# Patient Record
Sex: FEMALE | Race: OTHER | Employment: FULL TIME | URBAN - METROPOLITAN AREA
[De-identification: names, ages, dates, MRNs, and addresses within clinical notes are randomized per-mention and may not be internally consistent; named-entity substitution may affect disease eponyms.]

---

## 2017-01-25 RX ORDER — DICLOFENAC POTASSIUM 50 MG/1
TABLET, FILM COATED ORAL
Qty: 60 TABLET | Refills: 0 | Status: SHIPPED | OUTPATIENT
Start: 2017-01-25 | End: 2017-03-25

## 2017-03-08 ENCOUNTER — OFFICE VISIT (OUTPATIENT)
Dept: FAMILY MEDICINE CLINIC | Facility: CLINIC | Age: 57
End: 2017-03-08

## 2017-03-08 ENCOUNTER — APPOINTMENT (OUTPATIENT)
Dept: LAB | Age: 57
End: 2017-03-08
Attending: FAMILY MEDICINE
Payer: COMMERCIAL

## 2017-03-08 VITALS
BODY MASS INDEX: 35 KG/M2 | TEMPERATURE: 98 F | SYSTOLIC BLOOD PRESSURE: 156 MMHG | DIASTOLIC BLOOD PRESSURE: 81 MMHG | WEIGHT: 163 LBS | HEART RATE: 71 BPM

## 2017-03-08 DIAGNOSIS — E78.00 PURE HYPERCHOLESTEROLEMIA: Primary | ICD-10-CM

## 2017-03-08 DIAGNOSIS — E78.00 PURE HYPERCHOLESTEROLEMIA: ICD-10-CM

## 2017-03-08 DIAGNOSIS — R73.03 PREDIABETES: ICD-10-CM

## 2017-03-08 DIAGNOSIS — G56.03 BILATERAL CARPAL TUNNEL SYNDROME: ICD-10-CM

## 2017-03-08 DIAGNOSIS — M15.9 PRIMARY OSTEOARTHRITIS INVOLVING MULTIPLE JOINTS: ICD-10-CM

## 2017-03-08 DIAGNOSIS — M25.511 ACUTE PAIN OF RIGHT SHOULDER: ICD-10-CM

## 2017-03-08 LAB
CHOLEST SERPL-MCNC: 146 MG/DL (ref 110–200)
HDLC SERPL-MCNC: 37 MG/DL
LDLC SERPL CALC-MCNC: 83 MG/DL (ref 0–99)
NONHDLC SERPL-MCNC: 109 MG/DL
TRIGL SERPL-MCNC: 131 MG/DL (ref 1–149)

## 2017-03-08 PROCEDURE — 80061 LIPID PANEL: CPT

## 2017-03-08 PROCEDURE — 99212 OFFICE O/P EST SF 10 MIN: CPT | Performed by: FAMILY MEDICINE

## 2017-03-08 PROCEDURE — 99214 OFFICE O/P EST MOD 30 MIN: CPT | Performed by: FAMILY MEDICINE

## 2017-03-08 PROCEDURE — 36415 COLL VENOUS BLD VENIPUNCTURE: CPT

## 2017-03-08 PROCEDURE — 83036 HEMOGLOBIN GLYCOSYLATED A1C: CPT

## 2017-03-08 RX ORDER — DICLOFENAC POTASSIUM 50 MG/1
TABLET, FILM COATED ORAL
Qty: 60 TABLET | Refills: 3 | Status: SHIPPED | OUTPATIENT
Start: 2017-03-08 | End: 2017-10-11

## 2017-03-08 RX ORDER — SIMVASTATIN 20 MG
20 TABLET ORAL NIGHTLY
Qty: 90 TABLET | Refills: 2 | Status: SHIPPED | OUTPATIENT
Start: 2017-03-08 | End: 2018-08-03

## 2017-03-08 NOTE — PROGRESS NOTES
3/8/2017  1:04 PM    Malini Verdugo is a 55year old female. Chief complaint(s): Patient presents with: Follow - Up  Pre-diabetes  Hyperlipidemia  Arthritis    HPI:     Malini Verdugo primary complaint is regarding as above.      In regard to the hyperl BREAST BIOPSY      FOOT SURGERY Right     BACK SURGERY        Family History   Problem Relation Age of Onset   • Asthma Father    • Diabetes Mother      type 2   • Breast Cancer Sister 61   • Breast Cancer Other 48     breast cancer      Social History: MCG/ACT Inhalation Aero Soln  Disp:  Rfl: 0       Allergies:    Benzoin                 Rash  Hydrocodone             Rash  Meperidine              Unknown      ROS:   Review of Systems   Constitutional: Negative for fever, chills and fatigue.    HENT: Juanito Brown negative Negative mg/dL   SPECIFIC GRAVITY 1.015 1.005 - 1.030   OCCULT BLOOD moderate Negative   PH, URINE 6.0 4.5 - 8.0   PROTEIN (URINE DIPSTICK) negative Negative/Trace mg/dL   UROBILINOGEN,SEMI-QN 0.2 0.0 - 1.9 mg/dL   NITRITE, URINE negative Negative GFR/NON- >60 >60   GFR/ >60 >60   -HEMOGLOBIN A1C   Result Value Ref Range   GLYCOHEMOGLOBIN (HgA1c) (L) 5.9 4.0 - 6.0 %   -LIPID PANEL   Result Value Ref Range   HDL CHOLESTEROL (P) 43 mg/dL   Cholesterol, Total 240 (H) 1 tablet 2      Sig: Take 1 tablet (20 mg total) by mouth nightly. RECOMMENDATIONS given include: Please, call our office with any questions or concerns.  Notify Dr Carrie Madison or the [facility] if there is a deterioration or worsening of the medic

## 2017-03-09 LAB — HBA1C MFR BLD: 6.3 % (ref 4–6)

## 2017-03-09 NOTE — PROGRESS NOTES
Quick Note:    Please call patient, results unchanged prediabetes , CPM, recheck in 12 months  ______

## 2017-03-23 ENCOUNTER — TELEPHONE (OUTPATIENT)
Dept: FAMILY MEDICINE CLINIC | Facility: CLINIC | Age: 57
End: 2017-03-23

## 2017-03-23 NOTE — TELEPHONE ENCOUNTER
----- Message from Bree Stringer MD sent at 3/9/2017  8:46 AM CST -----  Please call patient, results unchanged prediabetes , CPM, recheck in 12 months

## 2017-03-27 RX ORDER — DICLOFENAC POTASSIUM 50 MG/1
TABLET, FILM COATED ORAL
Qty: 60 TABLET | Refills: 0 | Status: SHIPPED | OUTPATIENT
Start: 2017-03-27 | End: 2017-05-08

## 2017-04-28 ENCOUNTER — TELEPHONE (OUTPATIENT)
Dept: FAMILY MEDICINE CLINIC | Facility: CLINIC | Age: 57
End: 2017-04-28

## 2017-04-28 NOTE — TELEPHONE ENCOUNTER
Pt would like a call back with her mammogram test results. Pt is requesting a call back in [address] [facility]

## 2017-05-08 RX ORDER — DICLOFENAC POTASSIUM 50 MG/1
TABLET, FILM COATED ORAL
Qty: 60 TABLET | Refills: 0 | Status: SHIPPED | OUTPATIENT
Start: 2017-05-08 | End: 2017-08-08

## 2017-05-17 ENCOUNTER — TELEPHONE (OUTPATIENT)
Dept: FAMILY MEDICINE CLINIC | Facility: CLINIC | Age: 57
End: 2017-05-17

## 2017-05-17 NOTE — TELEPHONE ENCOUNTER
Ghanaian SPEAKER -  Pt calling states she had mammogram done [address] [facility] she will call again to have results resent. Pt requesting call if not received or when results available.

## 2017-06-06 ENCOUNTER — TELEPHONE (OUTPATIENT)
Dept: FAMILY MEDICINE CLINIC | Facility: CLINIC | Age: 57
End: 2017-06-06

## 2017-06-06 NOTE — TELEPHONE ENCOUNTER
Pt calling requesting results of mammogram done at [facility]. Pt states she has requested the results be resent to our office.

## 2017-06-08 NOTE — TELEPHONE ENCOUNTER
Obtained results from [facility] and given to Dr. Eugene Simmons to review. Per Dr Eugene Simmons results were Benign. Called patient but she did not answer left Vm stating that her mammogram results were normal. Advised to c/b if she has additional questions.

## 2017-08-08 ENCOUNTER — OFFICE VISIT (OUTPATIENT)
Dept: FAMILY MEDICINE CLINIC | Facility: CLINIC | Age: 57
End: 2017-08-08

## 2017-08-08 ENCOUNTER — LAB ENCOUNTER (OUTPATIENT)
Dept: LAB | Age: 57
End: 2017-08-08
Attending: FAMILY MEDICINE
Payer: COMMERCIAL

## 2017-08-08 VITALS
BODY MASS INDEX: 34.09 KG/M2 | WEIGHT: 158 LBS | DIASTOLIC BLOOD PRESSURE: 78 MMHG | HEIGHT: 57 IN | SYSTOLIC BLOOD PRESSURE: 155 MMHG | TEMPERATURE: 98 F | HEART RATE: 68 BPM

## 2017-08-08 DIAGNOSIS — N95.0 POSTMENOPAUSAL BLEEDING: Primary | ICD-10-CM

## 2017-08-08 DIAGNOSIS — N95.0 POSTMENOPAUSAL BLEEDING: ICD-10-CM

## 2017-08-08 LAB
B-HCG SERPL-ACNC: 5.3 MIU/ML
CUVETTE LOT #: NORMAL NUMERIC
HEMOGLOBIN: 13.5 G/DL (ref 12–15)
TSH SERPL-ACNC: 1.47 UIU/ML (ref 0.45–5.33)

## 2017-08-08 PROCEDURE — 99214 OFFICE O/P EST MOD 30 MIN: CPT | Performed by: FAMILY MEDICINE

## 2017-08-08 PROCEDURE — 85018 HEMOGLOBIN: CPT | Performed by: FAMILY MEDICINE

## 2017-08-08 PROCEDURE — 83036 HEMOGLOBIN GLYCOSYLATED A1C: CPT

## 2017-08-08 PROCEDURE — 99212 OFFICE O/P EST SF 10 MIN: CPT | Performed by: FAMILY MEDICINE

## 2017-08-08 PROCEDURE — 36416 COLLJ CAPILLARY BLOOD SPEC: CPT | Performed by: FAMILY MEDICINE

## 2017-08-08 PROCEDURE — 84702 CHORIONIC GONADOTROPIN TEST: CPT | Performed by: FAMILY MEDICINE

## 2017-08-08 PROCEDURE — 36415 COLL VENOUS BLD VENIPUNCTURE: CPT

## 2017-08-08 PROCEDURE — 84443 ASSAY THYROID STIM HORMONE: CPT

## 2017-08-08 NOTE — PROGRESS NOTES
8/8/2017  1:07 PM    Spencer Clemente is a 65year old female. Chief complaint(s): Patient presents with:  Postmenopausal Bleeding: Patient states that she hadnt had a period in over a year. She recent had a heavy menstrual cycle that has lasted 8 days. Smokeless tobacco: Never Used                      Alcohol use: Yes           0.0 oz/week     Comment: sometimes/social basis only-beer       Immunizations:     Immunization History  Administered            Date(s) Ad cough and wheezing. Cardiovascular: Negative for chest pain and palpitations. Gastrointestinal: Negative for nausea, vomiting, abdominal pain, diarrhea and constipation. Genitourinary: Positive for menstrual problem and pelvic pain.  Negative for dys Free T4      POC Hemoglobin [67980]    REFERRALS: 1US PELVIS (TRANSABDOMINAL PELVIS)  (BQT=20283),       US PELVIS (TRANSABDOMINAL PELVIS)  (CDZ=26144). RECOMMENDATIONS given include: Please, call our office with any questions or concerns.  Notify

## 2017-08-09 LAB — HBA1C MFR BLD: 6.2 % (ref 4–6)

## 2017-08-24 RX ORDER — MOMETASONE FUROATE 1 MG/G
CREAM TOPICAL
Qty: 30 G | Refills: 0 | Status: SHIPPED | OUTPATIENT
Start: 2017-08-24 | End: 2019-03-19

## 2017-08-25 RX ORDER — MOMETASONE FUROATE 1 MG/G
CREAM TOPICAL
Qty: 30 G | Refills: 0 | OUTPATIENT
Start: 2017-08-25

## 2017-08-28 ENCOUNTER — TELEPHONE (OUTPATIENT)
Dept: FAMILY MEDICINE CLINIC | Facility: CLINIC | Age: 57
End: 2017-08-28

## 2017-08-28 NOTE — TELEPHONE ENCOUNTER
Spoke to pt she needs to bring in results or sign a release of record due to U/S being done in a different hospital.

## 2017-08-28 NOTE — TELEPHONE ENCOUNTER
Pt would like a call back with her ultra sound results. Per pt it was done on 8-23-17. Pt would like a call back in [place].

## 2017-08-29 ENCOUNTER — OFFICE VISIT (OUTPATIENT)
Dept: FAMILY MEDICINE CLINIC | Facility: CLINIC | Age: 57
End: 2017-08-29

## 2017-08-29 VITALS
SYSTOLIC BLOOD PRESSURE: 128 MMHG | WEIGHT: 158 LBS | TEMPERATURE: 99 F | HEART RATE: 71 BPM | BODY MASS INDEX: 34 KG/M2 | DIASTOLIC BLOOD PRESSURE: 75 MMHG

## 2017-08-29 DIAGNOSIS — N95.0 POSTMENOPAUSAL BLEEDING: Primary | ICD-10-CM

## 2017-08-29 DIAGNOSIS — R93.89 ENDOMETRIAL THICKENING ON ULTRA SOUND: ICD-10-CM

## 2017-08-29 PROCEDURE — 96372 THER/PROPH/DIAG INJ SC/IM: CPT | Performed by: FAMILY MEDICINE

## 2017-08-29 PROCEDURE — 58100 BIOPSY OF UTERUS LINING: CPT | Performed by: FAMILY MEDICINE

## 2017-08-29 RX ORDER — KETOROLAC TROMETHAMINE 30 MG/ML
30 INJECTION, SOLUTION INTRAMUSCULAR; INTRAVENOUS ONCE
Status: COMPLETED | OUTPATIENT
Start: 2017-08-29 | End: 2017-08-29

## 2017-08-29 RX ADMIN — KETOROLAC TROMETHAMINE 30 MG: 30 INJECTION, SOLUTION INTRAMUSCULAR; INTRAVENOUS at 15:44:00

## 2017-08-29 NOTE — PROGRESS NOTES
8/29/2017  3:08 PM    Brian Collier is a 65year old female. Chief complaint(s): Patient presents with:  Procedure: EMB    HPI:     Brian Collier primary complaint is regarding endometrial biopsy.      Patient is a 52-year-old female who is postmenopau 07/23/2011      Medications (Active prior to today's visit):    Current Outpatient Prescriptions:  MOMETASONE FUROATE 0.1 % External Cream APPLY EXTERNALLY TO THE AFFECTED AREA TWICE DAILY Disp: 30 g Rfl: 0   Diclofenac Potassium 50 MG Oral Tab TAKE 1 TABL LMP 08/01/2017 (Exact Date)   BMI 34.19 kg/m²    HENT:   Head: Normocephalic. Right Ear: External ear normal.   Left Ear: External ear normal.   Nose: No rhinorrhea.    Mouth/Throat: Oropharynx is clear and moist.   Eyes: Conjunctivae are normal.   Neck: 5. 33 uIU/mL   -HEMOGLOBIN A1C   Result Value Ref Range   Glycohemoglobin (HgA1c) 6.2 (H) 4.0 - 6.0 %       EKG / Spirometry : -     Radiology: No results found. -    ASSESSMENT/PLAN:   Assessment   Postmenopausal bleeding  (primary encounter diagnosis)  En

## 2017-09-12 ENCOUNTER — OFFICE VISIT (OUTPATIENT)
Dept: FAMILY MEDICINE CLINIC | Facility: CLINIC | Age: 57
End: 2017-09-12

## 2017-09-12 ENCOUNTER — HOSPITAL ENCOUNTER (OUTPATIENT)
Dept: MRI IMAGING | Facility: HOSPITAL | Age: 57
Discharge: HOME | End: 2017-09-12
Attending: FAMILY MEDICINE
Payer: COMMERCIAL

## 2017-09-12 VITALS — WEIGHT: 162 LBS | BODY MASS INDEX: 35 KG/M2 | SYSTOLIC BLOOD PRESSURE: 180 MMHG | DIASTOLIC BLOOD PRESSURE: 90 MMHG

## 2017-09-12 DIAGNOSIS — R22.0 MANDIBULAR MASS: ICD-10-CM

## 2017-09-12 DIAGNOSIS — R03.0 BORDERLINE HIGH BLOOD PRESSURE: ICD-10-CM

## 2017-09-12 DIAGNOSIS — R93.89 ENDOMETRIAL THICKENING ON ULTRA SOUND: Primary | ICD-10-CM

## 2017-09-12 PROCEDURE — 70543 MRI ORBT/FAC/NCK W/O &W/DYE: CPT | Performed by: FAMILY MEDICINE

## 2017-09-12 PROCEDURE — 99214 OFFICE O/P EST MOD 30 MIN: CPT | Performed by: FAMILY MEDICINE

## 2017-09-12 PROCEDURE — A9575 INJ GADOTERATE MEGLUMI 0.1ML: HCPCS | Performed by: FAMILY MEDICINE

## 2017-09-12 PROCEDURE — 99212 OFFICE O/P EST SF 10 MIN: CPT | Performed by: FAMILY MEDICINE

## 2017-09-12 NOTE — PROGRESS NOTES
9/12/2017  11:28 AM    Jillian Vargas is a 65year old female. Chief complaint(s): Patient presents with:  Procedure Follow Up  Test Results    HPI:     Jillian Vargas primary complaint is regarding irregular vaginal bleeding and mandibular mass.      An CUFF,ACUTE Left      Comment: 1/2014  No date: TUBAL LIGATION Bilateral   Family History   Problem Relation Age of Onset   • Breast Cancer Other 48     breast cancer   • Asthma Father    • Diabetes Mother      type 2   • Breast Cancer Sister 61      Social  (90 BASE) MCG/ACT Inhalation Aero Soln  Disp:  Rfl: 0       Allergies:    Benzoin                 Rash  Hydrocodone             Rash  Meperidine              Unknown      ROS:   Review of Systems   Constitutional: Negative for chills, fatigue and f Zacarias Hankins                                                              Pathologist:           Dimitri Garcia MD                                                              Specimen:    Endometrium effects. FOLLOW-UP: Schedule a follow-up visit in 1 week . 3. Borderline high blood pressure       RECOMMENDATIONS given include: Please, call our office with any questions or concerns.  Notify Dr Raya Carpenter or the [facility] if there is a de

## 2017-09-19 ENCOUNTER — OFFICE VISIT (OUTPATIENT)
Dept: FAMILY MEDICINE CLINIC | Facility: CLINIC | Age: 57
End: 2017-09-19

## 2017-09-19 VITALS
TEMPERATURE: 98 F | WEIGHT: 163 LBS | BODY MASS INDEX: 35 KG/M2 | DIASTOLIC BLOOD PRESSURE: 80 MMHG | HEART RATE: 68 BPM | SYSTOLIC BLOOD PRESSURE: 135 MMHG

## 2017-09-19 DIAGNOSIS — D17.0 LIPOMA OF HEAD: Primary | ICD-10-CM

## 2017-09-19 PROCEDURE — 99212 OFFICE O/P EST SF 10 MIN: CPT | Performed by: FAMILY MEDICINE

## 2017-09-19 PROCEDURE — 99214 OFFICE O/P EST MOD 30 MIN: CPT | Performed by: FAMILY MEDICINE

## 2017-09-19 RX ORDER — ESOMEPRAZOLE MAGNESIUM 40 MG/1
40 CAPSULE, DELAYED RELEASE ORAL
Qty: 30 CAPSULE | Refills: 3 | Status: SHIPPED | OUTPATIENT
Start: 2017-09-19 | End: 2017-10-11

## 2017-09-19 NOTE — PROGRESS NOTES
9/19/2017  10:09 AM    Aleksander Strickland is a 65year old female. Chief complaint(s): Patient presents with:  Test Results: MRI results     HPI:     Aleksander Strickland primary complaint is regarding jaw mass.      Patient is here for follow up regarding left ja today's visit):    Current Outpatient Prescriptions:  MOMETASONE FUROATE 0.1 % External Cream APPLY EXTERNALLY TO THE AFFECTED AREA TWICE DAILY Disp: 30 g Rfl: 0   Diclofenac Potassium 50 MG Oral Tab TAKE 1 TABLET BY MOUTH TWICE DAILY WITH FOOD Disp: 60 ta Ear: External ear normal.   Left Ear: External ear normal.   Nose: No rhinorrhea. Mouth/Throat: Oropharynx is clear and moist.   + left mandible mass 3-4 cm, NT   Eyes: Conjunctivae are normal.   Neck: Neck supple.    Cardiovascular: Normal rate and regul likely physiologic. No definite suspicious cervical lymphadenopathy. VASCULATURE: Limited views are unremarkable.   BONES: There are multilevel degenerative changes of the cervical spine, most pronounced at the C5-C6 level, with a large posterior disc-osteo This Visit:  No orders of the defined types were placed in this encounter.       Meds This Visit:    No prescriptions requested or ordered in this encounter    Imaging & Referrals:  None         Ever Seals MD

## 2017-09-26 ENCOUNTER — OFFICE VISIT (OUTPATIENT)
Dept: OTOLARYNGOLOGY | Facility: CLINIC | Age: 57
End: 2017-09-26

## 2017-09-26 VITALS
DIASTOLIC BLOOD PRESSURE: 76 MMHG | SYSTOLIC BLOOD PRESSURE: 142 MMHG | WEIGHT: 162 LBS | BODY MASS INDEX: 32.66 KG/M2 | TEMPERATURE: 98 F | HEIGHT: 59 IN

## 2017-09-26 DIAGNOSIS — D17.0 LIPOMA OF FACE: Primary | ICD-10-CM

## 2017-09-26 PROCEDURE — 99244 OFF/OP CNSLTJ NEW/EST MOD 40: CPT | Performed by: OTOLARYNGOLOGY

## 2017-09-26 PROCEDURE — 99212 OFFICE O/P EST SF 10 MIN: CPT | Performed by: OTOLARYNGOLOGY

## 2017-09-26 NOTE — PROGRESS NOTES
Aleksander Strickland is a 65year old female. Patient presents with:  Consult: lipoma of left side of face       HISTORY OF PRESENT ILLNESS  7/1/14 She presents with a history of excision of the lesion of her left lower face many years ago by another physician. dx'd in    • CTS (carpal tunnel syndrome)    • Diverticulosis    • Facial mass     benign       Past Surgical History:  No date: BACK SURGERY  No date: BREAST BIOPSY  No date:       Comment: x2  No date: FOOT SURGERY Right  No date: REPA Normal.   Skin Normal Inspection -4 cm lipomatous lesion anterior to the parotid on the left. Lymph Detail Normal Submental. Submandibular. Anterior cervical. Posterior cervical. Supraclavicular.         Nose/Mouth/Throat Normal External nose - Kandis Seton have this removed once in the past and it recurred. She also understands the risks of surgery to include but not be limited to postoperative pain, bleeding as well as anesthesia use. We will use intraoperative facial nerve monitoring.   I have asked that

## 2017-10-03 ENCOUNTER — OFFICE VISIT (OUTPATIENT)
Dept: FAMILY MEDICINE CLINIC | Facility: CLINIC | Age: 57
End: 2017-10-03

## 2017-10-03 ENCOUNTER — LAB ENCOUNTER (OUTPATIENT)
Dept: LAB | Age: 57
End: 2017-10-03
Attending: FAMILY MEDICINE
Payer: COMMERCIAL

## 2017-10-03 ENCOUNTER — HOSPITAL ENCOUNTER (OUTPATIENT)
Dept: GENERAL RADIOLOGY | Age: 57
Discharge: HOME | End: 2017-10-03
Attending: FAMILY MEDICINE
Payer: COMMERCIAL

## 2017-10-03 VITALS
SYSTOLIC BLOOD PRESSURE: 143 MMHG | TEMPERATURE: 98 F | DIASTOLIC BLOOD PRESSURE: 85 MMHG | HEIGHT: 59 IN | WEIGHT: 164 LBS | HEART RATE: 71 BPM | BODY MASS INDEX: 33.06 KG/M2

## 2017-10-03 DIAGNOSIS — Z01.818 PREOPERATIVE CLEARANCE: Primary | ICD-10-CM

## 2017-10-03 DIAGNOSIS — Z01.818 PREOPERATIVE CLEARANCE: ICD-10-CM

## 2017-10-03 DIAGNOSIS — R22.0 MANDIBULAR MASS: ICD-10-CM

## 2017-10-03 DIAGNOSIS — M75.111 PARTIAL NONTRAUMATIC RUPTURE OF RIGHT ROTATOR CUFF: ICD-10-CM

## 2017-10-03 PROCEDURE — 99212 OFFICE O/P EST SF 10 MIN: CPT | Performed by: FAMILY MEDICINE

## 2017-10-03 PROCEDURE — 71020 XR CHEST PA + LAT CHEST (CPT=71020): CPT | Performed by: FAMILY MEDICINE

## 2017-10-03 PROCEDURE — 93000 ELECTROCARDIOGRAM COMPLETE: CPT | Performed by: FAMILY MEDICINE

## 2017-10-03 PROCEDURE — 93005 ELECTROCARDIOGRAM TRACING: CPT | Performed by: FAMILY MEDICINE

## 2017-10-03 PROCEDURE — 85025 COMPLETE CBC W/AUTO DIFF WBC: CPT

## 2017-10-03 PROCEDURE — 99214 OFFICE O/P EST MOD 30 MIN: CPT | Performed by: FAMILY MEDICINE

## 2017-10-03 PROCEDURE — 80053 COMPREHEN METABOLIC PANEL: CPT

## 2017-10-03 PROCEDURE — 36415 COLL VENOUS BLD VENIPUNCTURE: CPT

## 2017-10-03 RX ORDER — RANITIDINE 150 MG/1
150 CAPSULE ORAL
COMMUNITY
End: 2018-02-17

## 2017-10-03 NOTE — PROGRESS NOTES
10/3/2017  9:50 AM    Bella Fields is a 65year old female.     Chief complaint(s): Patient presents with:  Pre-Op Exam: Patient is scheduled for two upcoming surgeries, need medical clearance by PCP    HPI:     Bella Fields primary complaint is regardi 11/27/2012 09/12/2015      Influenza A (H1N1)    12/19/2009      TD                    07/13/2006      TDAP                  08/09/2014      Tb Intradermal Test   07/23/2011      Medications (Active prior to today's visit):    Current Ou Temp (!) 97.5 °F (36.4 °C) (Oral)   Ht 4' 11\" (1.499 m)   Wt 164 lb (74.4 kg)   LMP 08/01/2017   Breastfeeding? No   BMI 33.12 kg/m²    HENT:   Head: Normocephalic.    Right Ear: External ear normal.   Left Ear: External ear normal.   Nose: No rhinorrhea mass. SINUSES: Normal.  Limited views show no significant fluid or mucosal thickening. NECK GLANDS: Normal.  The parotid, submandibular, and thyroid glands are unremarkable.   LYMPH NODES: Scattered nonenlarged level I and II cervical lymph nodes are seen AFFECTED AREA TWICE DAILY, Disp: 30 g, Rfl: 0  •  Diclofenac Potassium 50 MG Oral Tab, TAKE 1 TABLET BY MOUTH TWICE DAILY WITH FOOD, Disp: 60 tablet, Rfl: 3  •  simvastatin 20 MG Oral Tab, Take 1 tablet (20 mg total) by mouth nightly., Disp: 90 tablet, Rfl

## 2017-10-11 ENCOUNTER — HOSPITAL ENCOUNTER (OUTPATIENT)
Facility: HOSPITAL | Age: 57
Setting detail: HOSPITAL OUTPATIENT SURGERY
Discharge: HOME | End: 2017-10-11
Attending: OTOLARYNGOLOGY | Admitting: OTOLARYNGOLOGY
Payer: COMMERCIAL

## 2017-10-11 ENCOUNTER — SURGERY (OUTPATIENT)
Age: 57
End: 2017-10-11

## 2017-10-11 ENCOUNTER — ANESTHESIA (OUTPATIENT)
Dept: SURGERY | Facility: HOSPITAL | Age: 57
End: 2017-10-11
Payer: COMMERCIAL

## 2017-10-11 ENCOUNTER — ANESTHESIA EVENT (OUTPATIENT)
Dept: SURGERY | Facility: HOSPITAL | Age: 57
End: 2017-10-11
Payer: COMMERCIAL

## 2017-10-11 VITALS
WEIGHT: 165 LBS | TEMPERATURE: 98 F | HEART RATE: 82 BPM | RESPIRATION RATE: 16 BRPM | SYSTOLIC BLOOD PRESSURE: 126 MMHG | DIASTOLIC BLOOD PRESSURE: 79 MMHG | HEIGHT: 59 IN | OXYGEN SATURATION: 99 % | BODY MASS INDEX: 33.26 KG/M2

## 2017-10-11 DIAGNOSIS — D17.0 LIPOMA OF SKIN AND SUBCUTANEOUS TISSUE OF FACE: ICD-10-CM

## 2017-10-11 PROBLEM — M79.89 MASS OF SOFT TISSUE OF FACE: Status: ACTIVE | Noted: 2017-10-11

## 2017-10-11 PROBLEM — M79.89 MASS OF SOFT TISSUE OF FACE: Status: RESOLVED | Noted: 2017-10-11 | Resolved: 2017-10-11

## 2017-10-11 PROCEDURE — 0JB10ZZ EXCISION OF FACE SUBCUTANEOUS TISSUE AND FASCIA, OPEN APPROACH: ICD-10-PCS | Performed by: OTOLARYNGOLOGY

## 2017-10-11 PROCEDURE — 13132 CMPLX RPR F/C/C/M/N/AX/G/H/F: CPT | Performed by: OTOLARYNGOLOGY

## 2017-10-11 PROCEDURE — 21014 EXC FACE TUM DEEP 2 CM/>: CPT | Performed by: OTOLARYNGOLOGY

## 2017-10-11 RX ORDER — ONDANSETRON 2 MG/ML
INJECTION INTRAMUSCULAR; INTRAVENOUS AS NEEDED
Status: DISCONTINUED | OUTPATIENT
Start: 2017-10-11 | End: 2017-10-11 | Stop reason: SURG

## 2017-10-11 RX ORDER — SODIUM CHLORIDE, SODIUM LACTATE, POTASSIUM CHLORIDE, CALCIUM CHLORIDE 600; 310; 30; 20 MG/100ML; MG/100ML; MG/100ML; MG/100ML
INJECTION, SOLUTION INTRAVENOUS CONTINUOUS
Status: DISCONTINUED | OUTPATIENT
Start: 2017-10-11 | End: 2017-10-11

## 2017-10-11 RX ORDER — ACETAMINOPHEN 325 MG/1
650 TABLET ORAL EVERY 6 HOURS PRN
Status: DISCONTINUED | OUTPATIENT
Start: 2017-10-11 | End: 2017-10-11

## 2017-10-11 RX ORDER — ONDANSETRON 2 MG/ML
4 INJECTION INTRAMUSCULAR; INTRAVENOUS EVERY 6 HOURS PRN
Status: DISCONTINUED | OUTPATIENT
Start: 2017-10-11 | End: 2017-10-11

## 2017-10-11 RX ORDER — HYDROMORPHONE HYDROCHLORIDE 1 MG/ML
0.4 INJECTION, SOLUTION INTRAMUSCULAR; INTRAVENOUS; SUBCUTANEOUS EVERY 5 MIN PRN
Status: DISCONTINUED | OUTPATIENT
Start: 2017-10-11 | End: 2017-10-11

## 2017-10-11 RX ORDER — ONDANSETRON 2 MG/ML
4 INJECTION INTRAMUSCULAR; INTRAVENOUS ONCE AS NEEDED
Status: DISCONTINUED | OUTPATIENT
Start: 2017-10-11 | End: 2017-10-11

## 2017-10-11 RX ORDER — HYDROCODONE BITARTRATE AND ACETAMINOPHEN 5; 325 MG/1; MG/1
2 TABLET ORAL AS NEEDED
Status: DISCONTINUED | OUTPATIENT
Start: 2017-10-11 | End: 2017-10-11

## 2017-10-11 RX ORDER — FAMOTIDINE 20 MG/1
20 TABLET ORAL ONCE
Status: COMPLETED | OUTPATIENT
Start: 2017-10-11 | End: 2017-10-11

## 2017-10-11 RX ORDER — ROCURONIUM BROMIDE 10 MG/ML
INJECTION, SOLUTION INTRAVENOUS AS NEEDED
Status: DISCONTINUED | OUTPATIENT
Start: 2017-10-11 | End: 2017-10-11 | Stop reason: SURG

## 2017-10-11 RX ORDER — MORPHINE SULFATE 10 MG/ML
6 INJECTION, SOLUTION INTRAMUSCULAR; INTRAVENOUS EVERY 10 MIN PRN
Status: DISCONTINUED | OUTPATIENT
Start: 2017-10-11 | End: 2017-10-11

## 2017-10-11 RX ORDER — HYDROCODONE BITARTRATE AND ACETAMINOPHEN 5; 325 MG/1; MG/1
1 TABLET ORAL AS NEEDED
Status: DISCONTINUED | OUTPATIENT
Start: 2017-10-11 | End: 2017-10-11

## 2017-10-11 RX ORDER — DEXAMETHASONE SODIUM PHOSPHATE 4 MG/ML
VIAL (ML) INJECTION AS NEEDED
Status: DISCONTINUED | OUTPATIENT
Start: 2017-10-11 | End: 2017-10-11 | Stop reason: SURG

## 2017-10-11 RX ORDER — LIDOCAINE HYDROCHLORIDE 10 MG/ML
INJECTION, SOLUTION EPIDURAL; INFILTRATION; INTRACAUDAL; PERINEURAL AS NEEDED
Status: DISCONTINUED | OUTPATIENT
Start: 2017-10-11 | End: 2017-10-11 | Stop reason: SURG

## 2017-10-11 RX ORDER — NALOXONE HYDROCHLORIDE 0.4 MG/ML
80 INJECTION, SOLUTION INTRAMUSCULAR; INTRAVENOUS; SUBCUTANEOUS AS NEEDED
Status: DISCONTINUED | OUTPATIENT
Start: 2017-10-11 | End: 2017-10-11

## 2017-10-11 RX ORDER — HYDROMORPHONE HYDROCHLORIDE 1 MG/ML
0.2 INJECTION, SOLUTION INTRAMUSCULAR; INTRAVENOUS; SUBCUTANEOUS EVERY 5 MIN PRN
Status: DISCONTINUED | OUTPATIENT
Start: 2017-10-11 | End: 2017-10-11

## 2017-10-11 RX ORDER — MORPHINE SULFATE 4 MG/ML
4 INJECTION, SOLUTION INTRAMUSCULAR; INTRAVENOUS EVERY 10 MIN PRN
Status: DISCONTINUED | OUTPATIENT
Start: 2017-10-11 | End: 2017-10-11

## 2017-10-11 RX ORDER — ACETAMINOPHEN 325 MG/1
650 TABLET ORAL ONCE
Status: COMPLETED | OUTPATIENT
Start: 2017-10-11 | End: 2017-10-11

## 2017-10-11 RX ORDER — LIDOCAINE HYDROCHLORIDE AND EPINEPHRINE 10; 10 MG/ML; UG/ML
INJECTION, SOLUTION INFILTRATION; PERINEURAL AS NEEDED
Status: DISCONTINUED | OUTPATIENT
Start: 2017-10-11 | End: 2017-10-11 | Stop reason: HOSPADM

## 2017-10-11 RX ORDER — HALOPERIDOL 5 MG/ML
0.25 INJECTION INTRAMUSCULAR ONCE AS NEEDED
Status: DISCONTINUED | OUTPATIENT
Start: 2017-10-11 | End: 2017-10-11

## 2017-10-11 RX ORDER — CEPHALEXIN 500 MG/1
500 CAPSULE ORAL EVERY 8 HOURS
Qty: 21 CAPSULE | Refills: 0 | Status: SHIPPED | OUTPATIENT
Start: 2017-10-11 | End: 2018-02-17

## 2017-10-11 RX ORDER — SODIUM CHLORIDE 0.9 % (FLUSH) 0.9 %
10 SYRINGE (ML) INJECTION AS NEEDED
Status: DISCONTINUED | OUTPATIENT
Start: 2017-10-11 | End: 2017-10-11

## 2017-10-11 RX ORDER — HYDROMORPHONE HYDROCHLORIDE 1 MG/ML
0.6 INJECTION, SOLUTION INTRAMUSCULAR; INTRAVENOUS; SUBCUTANEOUS EVERY 5 MIN PRN
Status: DISCONTINUED | OUTPATIENT
Start: 2017-10-11 | End: 2017-10-11

## 2017-10-11 RX ORDER — METOCLOPRAMIDE 10 MG/1
10 TABLET ORAL ONCE
Status: COMPLETED | OUTPATIENT
Start: 2017-10-11 | End: 2017-10-11

## 2017-10-11 RX ORDER — MIDAZOLAM HYDROCHLORIDE 1 MG/ML
INJECTION INTRAMUSCULAR; INTRAVENOUS AS NEEDED
Status: DISCONTINUED | OUTPATIENT
Start: 2017-10-11 | End: 2017-10-11 | Stop reason: SURG

## 2017-10-11 RX ORDER — MORPHINE SULFATE 2 MG/ML
2 INJECTION, SOLUTION INTRAMUSCULAR; INTRAVENOUS EVERY 10 MIN PRN
Status: DISCONTINUED | OUTPATIENT
Start: 2017-10-11 | End: 2017-10-11

## 2017-10-11 RX ORDER — SUCCINYLCHOLINE CHLORIDE 20 MG/ML
INJECTION INTRAMUSCULAR; INTRAVENOUS AS NEEDED
Status: DISCONTINUED | OUTPATIENT
Start: 2017-10-11 | End: 2017-10-11 | Stop reason: SURG

## 2017-10-11 RX ADMIN — SUCCINYLCHOLINE CHLORIDE 120 MG: 20 INJECTION INTRAMUSCULAR; INTRAVENOUS at 07:30:00

## 2017-10-11 RX ADMIN — ONDANSETRON 4 MG: 2 INJECTION INTRAMUSCULAR; INTRAVENOUS at 07:32:00

## 2017-10-11 RX ADMIN — ROCURONIUM BROMIDE 10 MG: 10 INJECTION, SOLUTION INTRAVENOUS at 07:30:00

## 2017-10-11 RX ADMIN — DEXAMETHASONE SODIUM PHOSPHATE 8 MG: 4 MG/ML VIAL (ML) INJECTION at 08:05:00

## 2017-10-11 RX ADMIN — SODIUM CHLORIDE, SODIUM LACTATE, POTASSIUM CHLORIDE, CALCIUM CHLORIDE: 600; 310; 30; 20 INJECTION, SOLUTION INTRAVENOUS at 08:28:00

## 2017-10-11 RX ADMIN — MIDAZOLAM HYDROCHLORIDE 2 MG: 1 INJECTION INTRAMUSCULAR; INTRAVENOUS at 07:30:00

## 2017-10-11 RX ADMIN — LIDOCAINE HYDROCHLORIDE 50 MG: 10 INJECTION, SOLUTION EPIDURAL; INFILTRATION; INTRACAUDAL; PERINEURAL at 07:30:00

## 2017-10-11 NOTE — OPERATIVE REPORT
[facility]    PATIENT'S NAME: Winter Barnhartels   ATTENDING PHYSICIAN: Mills Primrose. Gama Ramirez MD   OPERATING PHYSICIAN: Mills Primrose.  Gama Ramirez MD   PATIENT ACCOUNT#:   [id number]    LOCATION:  [facility]  MEDICAL RECORD #:   [id number]       DATE OF BI from surrounding tissues but noted to be significantly scarred to surrounding tissues as well as underlying soft tissues. The dissection was carried out carefully taking care not to injure any nerve structures.   Intraoperatively, no abnormalities and faci

## 2017-10-11 NOTE — ANESTHESIA POSTPROCEDURE EVALUATION
Patient: Pili Castle    Procedure Summary     Date:  10/11/17 Room / Location:  [address] [place] [address] MAIN OR 03 / [address] [place] [address] MAIN OR    Anesthesia Start:  0725 Anesthesia Stop:      Procedure:  EXCISION LESION HEAD/NECK/FACE (Left ) Diagnosis:       Lipoma of skin and subcutan

## 2017-10-11 NOTE — ANESTHESIA PREPROCEDURE EVALUATION
Anesthesia PreOp Note    HPI:     Nancy Hui is a 65year old female who presents for preoperative consultation requested by: Meghan Graham MD    Date of Surgery: 10/11/2017    Procedure(s):  EXCISION LESION HEAD/NECK/FACE  Indication: Lipoma of skin mouth daily Disp: 30 capsule Rfl: 3 Unknown at Unknown time   MOMETASONE FUROATE 0.1 % External Cream APPLY EXTERNALLY TO THE AFFECTED AREA TWICE DAILY Disp: 30 g Rfl: 0 Unknown at Unknown time   PROAIR  (90 BASE) MCG/ACT Inhalation Aero Soln Inhale Value Date    10/03/2017   K 4.1 10/03/2017    10/03/2017   CO2 30 10/03/2017   BUN 10 10/03/2017   CREATSERUM 0.55 10/03/2017    (H) 10/03/2017   CA 9.4 10/03/2017          Vital Signs: Body mass index is 33.33 kg/m².    height is 1.499

## 2017-10-11 NOTE — BRIEF OP NOTE
Pre-Operative Diagnosis: Lipoma of skin and subcutaneous tissue of face [D17.0]     Post-Operative Diagnosis: facial mass     Procedure Performed:   Procedure(s):  Excision and reconstruction of left facial mass     Surgeon(s) and Role:     Chelsea Vale

## 2017-10-11 NOTE — INTERVAL H&P NOTE
Pre-op Diagnosis: Lipoma of skin and subcutaneous tissue of face [D17.0]    The above referenced H&P was reviewed by Oni Britt.  Jerman Robertson MD on 10/11/2017, the patient was examined and no significant changes have occurred in the patient's condition since the H&

## 2017-10-11 NOTE — H&P
HISTORY OF PRESENT ILLNESS  7/1/14 She presents with a history of excision of the lesion of her left lower face many years ago by another physician. She was told that this was a fatty growth.  I have seen her in the past for this similar problem and I have • Facial mass       benign         Past Surgical History:  No date: BACK SURGERY  No date: BREAST BIOPSY  No date:       Comment: x2  No date: FOOT SURGERY Right  No date: REPAIR ROTATOR CUFF,ACUTE Left      Comment: 2014  No date: Sarah Platt lesion anterior to the parotid on the left.           Lymph Detail Normal Submental. Submandibular.  Anterior cervical. Posterior cervical. Supraclavicular.           Nose/Mouth/Throat Normal External nose - Normal. Lips/teeth/gums - Normal. Tonsils - Jessica Fix recurred. She also understands the risks of surgery to include but not be limited to postoperative pain, bleeding as well as anesthesia use. We will use intraoperative facial nerve monitoring.   I have asked that she be cleared medically by her primary ca

## 2017-10-12 ENCOUNTER — TELEPHONE (OUTPATIENT)
Dept: OTOLARYNGOLOGY | Facility: CLINIC | Age: 57
End: 2017-10-12

## 2017-10-12 NOTE — TELEPHONE ENCOUNTER
Per pt is doing well, some soreness and swelling at incision side,adivsed pt that symptoms are expected. Per  pt instructed to leave gauze on face over incision until Saturday and pt can take off and leave tape over sutures alone.  Per pt she nabeel

## 2017-10-17 ENCOUNTER — TELEPHONE (OUTPATIENT)
Dept: OTOLARYNGOLOGY | Facility: CLINIC | Age: 57
End: 2017-10-17

## 2017-10-17 NOTE — TELEPHONE ENCOUNTER
Language line , [id number]    Per pt, she removed the gauze pad that she was instructed to leave on her face because she noticed yellow drainage on the gauze. Pt noticed hives forming around where the tape was on her face.   Per pt, whe

## 2017-10-17 NOTE — TELEPHONE ENCOUNTER
Pt states sx incision is very red, states there is discharge, and there are spots on the incision. Pls advise thank you. Syriac speaker.

## 2017-10-18 ENCOUNTER — OFFICE VISIT (OUTPATIENT)
Dept: OTOLARYNGOLOGY | Facility: CLINIC | Age: 57
End: 2017-10-18

## 2017-10-18 ENCOUNTER — TELEPHONE (OUTPATIENT)
Dept: FAMILY MEDICINE CLINIC | Facility: CLINIC | Age: 57
End: 2017-10-18

## 2017-10-18 DIAGNOSIS — D17.0 LIPOMA OF FACE: Primary | ICD-10-CM

## 2017-10-18 PROCEDURE — 99024 POSTOP FOLLOW-UP VISIT: CPT | Performed by: OTOLARYNGOLOGY

## 2017-10-18 PROCEDURE — 99212 OFFICE O/P EST SF 10 MIN: CPT | Performed by: OTOLARYNGOLOGY

## 2017-10-18 NOTE — PROGRESS NOTES
Elisha Sharp is a 65year old female. No chief complaint on file.            Social History  Social History   Marital status:   Spouse name: N/A    Years of education: N/A  Number of children: N/A     Occupational History  None on file     Social H

## 2017-10-19 NOTE — TELEPHONE ENCOUNTER
Dr. Cleaning Drilling office calling and stts that they are looking for a approval for surgery. Pt has surgery 10/23. Please advise.

## 2017-10-20 ENCOUNTER — TELEPHONE (OUTPATIENT)
Dept: OTOLARYNGOLOGY | Facility: CLINIC | Age: 57
End: 2017-10-20

## 2017-10-20 NOTE — TELEPHONE ENCOUNTER
please see note below, pt had excision and reconstruction of left facial mass, pt seen by BEKAHK on 10/18 as she could not follow up on Thursday. Pt asking if she should follow up with in office also?  Please advise

## 2017-10-20 NOTE — TELEPHONE ENCOUNTER
Language line , [id number]    Per pt, pt wants to know if she can wash the wound on her face. Per LOV note, sutures were removed at visit 10/18; advised pt she may wash her face.   Per pt, she has completed her abx, she is only using the all

## 2017-10-26 ENCOUNTER — TELEPHONE (OUTPATIENT)
Dept: FAMILY MEDICINE CLINIC | Facility: CLINIC | Age: 57
End: 2017-10-26

## 2017-10-26 NOTE — TELEPHONE ENCOUNTER
Refill Protocol Appointment Criteria  · Appointment scheduled in the past 6 months or in the next 3 months  Recent Outpatient Visits            1 week ago Lipoma of face    [facility] for Megan Emery MD    Office

## 2017-10-26 NOTE — TELEPHONE ENCOUNTER
Pt requesting rx refill for Current Outpatient Prescriptions:  PROAIR  (90 BASE) MCG/ACT Inhalation Aero Soln Inhale 2 puffs into the lungs every 6 (six) hours as needed.    Disp:  Rfl: 0     Please advise- Pt is a Yoruba speaker and would like a ca

## 2017-10-28 NOTE — TELEPHONE ENCOUNTER
Pt notified of Dr. Smart April message below using  # [phone], will follow recommendations and call next week with an update.

## 2017-10-28 NOTE — TELEPHONE ENCOUNTER
Pt contacted to notify of below using  # [id number]. Pt reports that below the scar along the edge there is small little hard balls that are tender to touch, feels its tender because of surgery.  Pt denies any redness, no swelling, no fevers, no drai

## 2017-10-28 NOTE — TELEPHONE ENCOUNTER
Avoid any ointments and clean skin in that area with a mild soap. Avoid makeup for several weeks. Probably not likely to be anything bad.

## 2017-10-28 NOTE — TELEPHONE ENCOUNTER
Probably ok but ask her to schedule an appointment with Dr Enriqueta Jenkins sometime in the next week to reassess

## 2018-02-17 ENCOUNTER — LAB ENCOUNTER (OUTPATIENT)
Dept: LAB | Age: 58
End: 2018-02-17
Attending: FAMILY MEDICINE
Payer: COMMERCIAL

## 2018-02-17 ENCOUNTER — OFFICE VISIT (OUTPATIENT)
Dept: FAMILY MEDICINE CLINIC | Facility: CLINIC | Age: 58
End: 2018-02-17

## 2018-02-17 VITALS
BODY MASS INDEX: 33 KG/M2 | WEIGHT: 162 LBS | SYSTOLIC BLOOD PRESSURE: 122 MMHG | TEMPERATURE: 98 F | HEART RATE: 80 BPM | DIASTOLIC BLOOD PRESSURE: 60 MMHG

## 2018-02-17 DIAGNOSIS — M15.9 PRIMARY OSTEOARTHRITIS INVOLVING MULTIPLE JOINTS: ICD-10-CM

## 2018-02-17 DIAGNOSIS — M15.9 PRIMARY OSTEOARTHRITIS INVOLVING MULTIPLE JOINTS: Primary | ICD-10-CM

## 2018-02-17 LAB
CRP SERPL-MCNC: 0.9 MG/DL (ref 0–0.9)
ERYTHROCYTE [SEDIMENTATION RATE] IN BLOOD: 16 MM/HR (ref 0–30)
RHEUMATOID FACT SER QL: <5 IU/ML
URATE SERPL-MCNC: 4.6 MG/DL (ref 2.1–7.4)

## 2018-02-17 PROCEDURE — 99212 OFFICE O/P EST SF 10 MIN: CPT | Performed by: FAMILY MEDICINE

## 2018-02-17 PROCEDURE — 84550 ASSAY OF BLOOD/URIC ACID: CPT

## 2018-02-17 PROCEDURE — 86038 ANTINUCLEAR ANTIBODIES: CPT

## 2018-02-17 PROCEDURE — 36415 COLL VENOUS BLD VENIPUNCTURE: CPT | Performed by: FAMILY MEDICINE

## 2018-02-17 PROCEDURE — 86039 ANTINUCLEAR ANTIBODIES (ANA): CPT

## 2018-02-17 PROCEDURE — 86140 C-REACTIVE PROTEIN: CPT | Performed by: FAMILY MEDICINE

## 2018-02-17 PROCEDURE — 99214 OFFICE O/P EST MOD 30 MIN: CPT | Performed by: FAMILY MEDICINE

## 2018-02-17 PROCEDURE — 96372 THER/PROPH/DIAG INJ SC/IM: CPT | Performed by: FAMILY MEDICINE

## 2018-02-17 PROCEDURE — 86431 RHEUMATOID FACTOR QUANT: CPT | Performed by: FAMILY MEDICINE

## 2018-02-17 PROCEDURE — 85652 RBC SED RATE AUTOMATED: CPT | Performed by: FAMILY MEDICINE

## 2018-02-17 RX ORDER — KETOROLAC TROMETHAMINE 30 MG/ML
30 INJECTION, SOLUTION INTRAMUSCULAR; INTRAVENOUS ONCE
Status: COMPLETED | OUTPATIENT
Start: 2018-02-17 | End: 2018-02-17

## 2018-02-17 RX ORDER — MELOXICAM 15 MG/1
15 TABLET ORAL DAILY
Qty: 30 TABLET | Refills: 3 | Status: SHIPPED | OUTPATIENT
Start: 2018-02-17 | End: 2018-08-03

## 2018-02-17 RX ADMIN — KETOROLAC TROMETHAMINE 30 MG: 30 INJECTION, SOLUTION INTRAMUSCULAR; INTRAVENOUS at 12:38:00

## 2018-02-17 NOTE — PROGRESS NOTES
2/17/2018  12:23 PM    Brian Collier is a 65year old female. Chief complaint(s): Patient presents with:  Hand Pain: X 1 month  Foot Pain: X 1 month  Back Pain    HPI:     Brian Collier primary complaint is regarding arthritis.      Patient to be evalu Hypertension Sister       Social History: Smoking status: Never Smoker                                                              Smokeless tobacco: Never Used                      Alcohol use: Yes           0.0 oz/week     Comment: sometimes/social basi Neurological: Negative for seizures and headaches. Psychiatric/Behavioral: Negative for depressed mood. PHYSICAL EXAM:   Physical Exam    Constitutional: She appears well-developed and well-nourished.    /60   Pulse 80   Temp 97.8 °F (36.6 ° Acid, Serum    Meds This Visit:    Signed Prescriptions Disp Refills    Meloxicam 15 MG Oral Tab 30 tablet 3      Sig: Take 1 tablet (15 mg total) by mouth daily.            Imaging & Referrals:  None         Karon Elizalde MD

## 2018-02-19 LAB — NUCLEAR IGG TITR SER IF: POSITIVE {TITER}

## 2018-02-20 LAB — ANA NUCLEOLAR TITR SER IF: 80 {TITER}

## 2018-03-31 ENCOUNTER — OFFICE VISIT (OUTPATIENT)
Dept: FAMILY MEDICINE CLINIC | Facility: CLINIC | Age: 58
End: 2018-03-31

## 2018-03-31 VITALS
HEART RATE: 68 BPM | TEMPERATURE: 98 F | WEIGHT: 163 LBS | DIASTOLIC BLOOD PRESSURE: 60 MMHG | SYSTOLIC BLOOD PRESSURE: 110 MMHG | BODY MASS INDEX: 33 KG/M2

## 2018-03-31 DIAGNOSIS — M15.9 PRIMARY OSTEOARTHRITIS INVOLVING MULTIPLE JOINTS: Primary | ICD-10-CM

## 2018-03-31 DIAGNOSIS — G56.01 CARPAL TUNNEL SYNDROME OF RIGHT WRIST: ICD-10-CM

## 2018-03-31 DIAGNOSIS — R51.9 HEADACHE, CHRONIC DAILY: ICD-10-CM

## 2018-03-31 PROCEDURE — L3908 WHO COCK-UP NONMOLDE PRE OTS: HCPCS | Performed by: FAMILY MEDICINE

## 2018-03-31 PROCEDURE — 99212 OFFICE O/P EST SF 10 MIN: CPT | Performed by: FAMILY MEDICINE

## 2018-03-31 PROCEDURE — 99214 OFFICE O/P EST MOD 30 MIN: CPT | Performed by: FAMILY MEDICINE

## 2018-03-31 NOTE — PROGRESS NOTES
3/31/2018  12:47 PM    Betina Hatfield is a 65year old female. Chief complaint(s): Patient presents with: Follow - Up    HPI:     Betina Hatfield primary complaint is regarding arthritis and especially on her right hand.      Patient to be evaluated for CUFF,ACUTE Left      Comment: 1/2014  No date: SPINE SURGERY PROCEDURE UNLISTED  No date: TUBAL LIGATION Bilateral   Family History   Problem Relation Age of Onset   • Breast Cancer Other 48     breast cancer   • Cancer Other    • Asthma Father    • Diabet fever.   HENT: Negative for ear pain and sore throat. Respiratory: Negative for cough and wheezing. Cardiovascular: Negative for chest pain and palpitations.    Gastrointestinal: Negative for nausea, vomiting, abdominal pain, diarrhea and constipation daily    1. Primary osteoarthritis involving multiple joints    Doing well  MEDICATIONS:  CPM        RECOMMENDATIONS given include: Please, call our office with any questions or concerns.  Notify Dr Rian Britt or the [facility] if there is a deterioratio

## 2018-04-06 ENCOUNTER — HOSPITAL ENCOUNTER (OUTPATIENT)
Dept: MRI IMAGING | Age: 58
Discharge: HOME | End: 2018-04-06
Attending: FAMILY MEDICINE
Payer: COMMERCIAL

## 2018-04-06 DIAGNOSIS — R51.9 HEADACHE, CHRONIC DAILY: ICD-10-CM

## 2018-04-06 PROCEDURE — 70551 MRI BRAIN STEM W/O DYE: CPT | Performed by: FAMILY MEDICINE

## 2018-04-12 ENCOUNTER — TELEPHONE (OUTPATIENT)
Dept: FAMILY MEDICINE CLINIC | Facility: CLINIC | Age: 58
End: 2018-04-12

## 2018-04-13 NOTE — TELEPHONE ENCOUNTER
Pt. States that she is still waiting to get a call back to get her MRI results. Pt. Requesting to get a call back before 1:00.

## 2018-04-14 NOTE — TELEPHONE ENCOUNTER
CSS soft transfer the call, with [address] #[id number], advised Dr Venecia Moncada note and verbalized understanding. Note      Please call patient with normal results.  Normal MRI of the Brain

## 2018-08-03 ENCOUNTER — OFFICE VISIT (OUTPATIENT)
Dept: FAMILY MEDICINE CLINIC | Facility: CLINIC | Age: 58
End: 2018-08-03
Payer: COMMERCIAL

## 2018-08-03 ENCOUNTER — APPOINTMENT (OUTPATIENT)
Dept: LAB | Age: 58
End: 2018-08-03
Attending: NURSE PRACTITIONER
Payer: COMMERCIAL

## 2018-08-03 VITALS
HEART RATE: 97 BPM | BODY MASS INDEX: 33.06 KG/M2 | WEIGHT: 164 LBS | SYSTOLIC BLOOD PRESSURE: 142 MMHG | HEIGHT: 59 IN | DIASTOLIC BLOOD PRESSURE: 86 MMHG

## 2018-08-03 DIAGNOSIS — M54.42 ACUTE BILATERAL LOW BACK PAIN WITH LEFT-SIDED SCIATICA: Primary | ICD-10-CM

## 2018-08-03 DIAGNOSIS — R42 DIZZINESS: ICD-10-CM

## 2018-08-03 DIAGNOSIS — R03.0 ELEVATED BLOOD PRESSURE READING IN OFFICE WITHOUT DIAGNOSIS OF HYPERTENSION: ICD-10-CM

## 2018-08-03 DIAGNOSIS — R51.9 HEADACHE DISORDER: ICD-10-CM

## 2018-08-03 LAB
ALBUMIN SERPL BCP-MCNC: 4.1 G/DL (ref 3.5–4.8)
ALBUMIN/GLOB SERPL: 1.2 {RATIO} (ref 1–2)
ALP SERPL-CCNC: 74 U/L (ref 32–100)
ALT SERPL-CCNC: 48 U/L (ref 14–54)
ANION GAP SERPL CALC-SCNC: 9 MMOL/L (ref 0–18)
AST SERPL-CCNC: 30 U/L (ref 15–41)
BACTERIA UR QL AUTO: NEGATIVE /HPF
BILIRUB SERPL-MCNC: 0.7 MG/DL (ref 0.3–1.2)
BILIRUB UR QL: NEGATIVE
BUN SERPL-MCNC: 6 MG/DL (ref 8–20)
BUN/CREAT SERPL: 8.7 (ref 10–20)
CALCIUM SERPL-MCNC: 9.7 MG/DL (ref 8.5–10.5)
CHLORIDE SERPL-SCNC: 97 MMOL/L (ref 95–110)
CHOLEST SERPL-MCNC: 203 MG/DL (ref 110–200)
CLARITY UR: CLEAR
CO2 SERPL-SCNC: 30 MMOL/L (ref 22–32)
COLOR UR: YELLOW
CREAT SERPL-MCNC: 0.69 MG/DL (ref 0.5–1.5)
ERYTHROCYTE [DISTWIDTH] IN BLOOD BY AUTOMATED COUNT: 14.7 % (ref 11–15)
GLOBULIN PLAS-MCNC: 3.3 G/DL (ref 2.5–3.7)
GLUCOSE SERPL-MCNC: 117 MG/DL (ref 70–99)
GLUCOSE UR-MCNC: NEGATIVE MG/DL
HBA1C MFR BLD: 6.2 % (ref 4–6)
HCT VFR BLD AUTO: 42.5 % (ref 35–48)
HDLC SERPL-MCNC: 50 MG/DL
HGB BLD-MCNC: 14.4 G/DL (ref 12–16)
KETONES UR-MCNC: NEGATIVE MG/DL
LDLC SERPL CALC-MCNC: 121 MG/DL (ref 0–99)
LEUKOCYTE ESTERASE UR QL STRIP.AUTO: NEGATIVE
MCH RBC QN AUTO: 29.4 PG (ref 27–32)
MCHC RBC AUTO-ENTMCNC: 33.9 G/DL (ref 32–37)
MCV RBC AUTO: 86.8 FL (ref 80–100)
NITRITE UR QL STRIP.AUTO: NEGATIVE
NONHDLC SERPL-MCNC: 153 MG/DL
OSMOLALITY UR CALC.SUM OF ELEC: 281 MOSM/KG (ref 275–295)
PATIENT FASTING: YES
PH UR: 8 [PH] (ref 5–8)
PLATELET # BLD AUTO: 192 K/UL (ref 140–400)
PMV BLD AUTO: 9.8 FL (ref 7.4–10.3)
POTASSIUM SERPL-SCNC: 4 MMOL/L (ref 3.3–5.1)
PROT SERPL-MCNC: 7.4 G/DL (ref 5.9–8.4)
PROT UR-MCNC: NEGATIVE MG/DL
RBC # BLD AUTO: 4.89 M/UL (ref 3.7–5.4)
RBC #/AREA URNS AUTO: 1 /HPF
SODIUM SERPL-SCNC: 136 MMOL/L (ref 136–144)
SP GR UR STRIP: 1.01 (ref 1–1.03)
TRIGL SERPL-MCNC: 158 MG/DL (ref 1–149)
TSH SERPL-ACNC: 0.86 UIU/ML (ref 0.45–5.33)
UROBILINOGEN UR STRIP-ACNC: <2
VIT B12 SERPL-MCNC: 494 PG/ML (ref 181–914)
VIT C UR-MCNC: NEGATIVE MG/DL
WBC # BLD AUTO: 5.4 K/UL (ref 4–11)
WBC #/AREA URNS AUTO: <1 /HPF

## 2018-08-03 PROCEDURE — 82306 VITAMIN D 25 HYDROXY: CPT

## 2018-08-03 PROCEDURE — 85027 COMPLETE CBC AUTOMATED: CPT

## 2018-08-03 PROCEDURE — 80053 COMPREHEN METABOLIC PANEL: CPT

## 2018-08-03 PROCEDURE — 36415 COLL VENOUS BLD VENIPUNCTURE: CPT

## 2018-08-03 PROCEDURE — 83036 HEMOGLOBIN GLYCOSYLATED A1C: CPT

## 2018-08-03 PROCEDURE — 81001 URINALYSIS AUTO W/SCOPE: CPT

## 2018-08-03 PROCEDURE — 82607 VITAMIN B-12: CPT

## 2018-08-03 PROCEDURE — 84443 ASSAY THYROID STIM HORMONE: CPT

## 2018-08-03 PROCEDURE — 80061 LIPID PANEL: CPT

## 2018-08-03 PROCEDURE — 99214 OFFICE O/P EST MOD 30 MIN: CPT | Performed by: NURSE PRACTITIONER

## 2018-08-03 RX ORDER — CYCLOBENZAPRINE HCL 10 MG
10 TABLET ORAL 3 TIMES DAILY PRN
Qty: 30 TABLET | Refills: 1 | Status: SHIPPED | OUTPATIENT
Start: 2018-08-03 | End: 2018-10-13

## 2018-08-03 RX ORDER — CYCLOBENZAPRINE HCL 10 MG
TABLET ORAL
COMMUNITY
Start: 2018-07-27 | End: 2018-08-03

## 2018-08-03 RX ORDER — MELOXICAM 15 MG/1
15 TABLET ORAL DAILY
Qty: 30 TABLET | Refills: 3 | Status: SHIPPED | OUTPATIENT
Start: 2018-08-03 | End: 2019-02-01

## 2018-08-03 NOTE — PATIENT INSTRUCTIONS
Dolor De Claudio O De Espalda [Neck/Back Pain, General]    Tanto el dolor de claudio fredo el de espalda suelen ser consecuencia de rico lesión en los músculos o ligamentos de la columna vertebral. Algunas veces los discos que separan cada hueso de la column 4. Algunas personas encuentran alivio con la aplicación de calor (ducha o baño caliente, o rico compresa caliente) y Khadijah, mientras que otras prefieren aplicar frío (hielo neil o en cubos dentro de rico bolsa envuelta en rico toalla).  [facility] Hay ciertas cosas que usted puede hacer para prevenir la recurrencia del dolor de espalda kelly y reducir los síntomas del dolor de espalda crónico:  · Mantenga un peso saludable.  Si tiene sobrepeso, perder [employer] ayudará a aliviar la mayoría de los dolor · Puede usar acetaminofén (Tylenol) o ibuprofeno (Motrin o Advil) para controlar el dolor, a menos que le hayan recetado otro medicamento. [NOTA: Si tiene rico enfermedad hepática o renal crónica, o ha tenido alguna vez rico úlcera estomacal o sangrado gastro Cuando tenga que estar parado (de pie) sang largos períodos apoye la mayor parte del peso sobre rico pierna, [place] de pierna cada pocos minutos.   AL DORMIR  La mejor manera de dormir es de lado, con las rodillas dobladas.  Apoye la shahid en rico almo La mayoría de la gente tiene, de vez en cuando, dolor en la parte inferior de la espalda. En muchos casos no se trata de un problema yazan y el cuidado personal puede aliviarlo.  [place], algunas veces el lumbago (dolor lumbar) puede ser rico señal de un proble · No puede ponerse de pie o caminar. · Tiene rico fiebre superior a 101.0°F (38.3°C)  · Orina frecuentemente o con dolor, o tiene [employer] · Tiene un dolor abdominal muy intenso. · Siente un dolor kelly y punzante. · El dolor es pita.   ·

## 2018-08-03 NOTE — PROGRESS NOTES
HPI  Pt presents for f/u low back pain and seen in [facility]-was given flexeril and prednisone. Was feeling better until yesterday when pain started to come back. Was referred to ortho but never went for appt.  Was given meloxicam but is now out for past two wee Left     1/2014   • Spine surgery procedure unlisted     • Tubal ligation Bilateral        Family History   Problem Relation Age of Onset   • Breast Cancer Other 48     breast cancer   • Cancer Other    • Asthma Father    • Diabetes Mother      type 2   • ear canal normal. No cerumen present  Eyes: Conjunctivae and EOM are normal. Pupils are equal, round, and reactive to light. Right eye exhibits no discharge. Left eye exhibits no discharge. Neck: Normal range of motion. Neck supple.  No thyromegaly presen CBC, PLATELET; NO DIFFERENTIAL    COMP METABOLIC PANEL (14)    HEMOGLOBIN A1C    LIPID PANEL    ASSAY, THYROID STIM HORMONE    URINALYSIS, ROUTINE    VITAMIN B12    VITAMIN D, 25-HYDROXY                      Discussed plan of care with pt and pt is in a

## 2018-08-06 DIAGNOSIS — R31.1 BENIGN ESSENTIAL MICROSCOPIC HEMATURIA: ICD-10-CM

## 2018-08-06 DIAGNOSIS — E78.2 MIXED HYPERLIPIDEMIA: Primary | ICD-10-CM

## 2018-08-06 LAB — 25(OH)D3 SERPL-MCNC: 24.9 NG/ML

## 2018-08-06 RX ORDER — ATORVASTATIN CALCIUM 10 MG/1
10 TABLET, FILM COATED ORAL NIGHTLY
Qty: 90 TABLET | Refills: 3 | Status: SHIPPED | OUTPATIENT
Start: 2018-08-06 | End: 2019-11-25

## 2018-10-13 DIAGNOSIS — M54.42 ACUTE BILATERAL LOW BACK PAIN WITH LEFT-SIDED SCIATICA: ICD-10-CM

## 2018-10-15 RX ORDER — CYCLOBENZAPRINE HCL 10 MG
TABLET ORAL
Qty: 30 TABLET | Refills: 0 | Status: SHIPPED | OUTPATIENT
Start: 2018-10-15 | End: 2020-03-12

## 2018-10-22 ENCOUNTER — TELEPHONE (OUTPATIENT)
Dept: FAMILY MEDICINE CLINIC | Facility: CLINIC | Age: 58
End: 2018-10-22

## 2018-10-22 NOTE — TELEPHONE ENCOUNTER
Pt called in stating she needs a short term disability form for her employer filled out.  Her physician for PT stated that there were some activities that Pt was unable to preform and when she gave that information to her employer they stated that she would

## 2018-10-23 NOTE — TELEPHONE ENCOUNTER
Short term disability forms need to be addressed with [employer] or patient can set up appt with Dr FERNANDEZ bring forms and all necessary documents/information, not a guarantee however that Dr Jerome Sams will fill out.

## 2018-11-02 NOTE — TELEPHONE ENCOUNTER
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 Logged for processing.  NK

## 2018-11-07 ENCOUNTER — TELEPHONE (OUTPATIENT)
Dept: FAMILY MEDICINE CLINIC | Facility: CLINIC | Age: 58
End: 2018-11-07

## 2018-11-07 DIAGNOSIS — M54.42 ACUTE BILATERAL LOW BACK PAIN WITH LEFT-SIDED SCIATICA: Primary | ICD-10-CM

## 2018-11-07 NOTE — TELEPHONE ENCOUNTER
Called [facility] and requested that they send us most recent progress notes, nothing in scanning, need to give notes to RITU Archuleta to review and advise on PT continuation.  The person who I spoke too said someone will call the office tomorrow to follow up

## 2018-11-07 NOTE — TELEPHONE ENCOUNTER
Dr. Rob Kay   PC from pt 11-7-18 Bulgarian speaking only.  from the language line helped translate. Pt requesting medical leave disability forms be completed.   Pt states she dropped off forms at appt on  11-1-18. however she said her appt was c

## 2018-11-07 NOTE — TELEPHONE ENCOUNTER
Santhosh Coyle 148 called in stating that the current script for PT has . She is requesting a new order/script be faxed to their office at #[phone]. Please advise.

## 2018-11-07 NOTE — TELEPHONE ENCOUNTER
Pt called in to follow up on request. She states that she had not started PT and she would like to do so now. Please advise.

## 2018-11-08 NOTE — TELEPHONE ENCOUNTER
Per Parul/LUISA from [facility] would like the updated Order fax to [phone]. Pt appt in on 10/14/2018.

## 2018-11-12 ENCOUNTER — LAB ENCOUNTER (OUTPATIENT)
Dept: LAB | Age: 58
End: 2018-11-12
Attending: FAMILY MEDICINE
Payer: COMMERCIAL

## 2018-11-12 ENCOUNTER — APPOINTMENT (OUTPATIENT)
Dept: LAB | Age: 58
End: 2018-11-12
Attending: FAMILY MEDICINE
Payer: COMMERCIAL

## 2018-11-12 ENCOUNTER — OFFICE VISIT (OUTPATIENT)
Dept: FAMILY MEDICINE CLINIC | Facility: CLINIC | Age: 58
End: 2018-11-12
Payer: COMMERCIAL

## 2018-11-12 VITALS
SYSTOLIC BLOOD PRESSURE: 170 MMHG | HEART RATE: 68 BPM | BODY MASS INDEX: 34.63 KG/M2 | HEIGHT: 58 IN | DIASTOLIC BLOOD PRESSURE: 90 MMHG | WEIGHT: 165 LBS

## 2018-11-12 DIAGNOSIS — Z12.11 COLON CANCER SCREENING: ICD-10-CM

## 2018-11-12 DIAGNOSIS — I10 ESSENTIAL HYPERTENSION: ICD-10-CM

## 2018-11-12 DIAGNOSIS — Z00.00 PHYSICAL EXAM: Primary | ICD-10-CM

## 2018-11-12 DIAGNOSIS — Z00.00 PHYSICAL EXAM: ICD-10-CM

## 2018-11-12 PROCEDURE — 81015 MICROSCOPIC EXAM OF URINE: CPT | Performed by: FAMILY MEDICINE

## 2018-11-12 PROCEDURE — 80053 COMPREHEN METABOLIC PANEL: CPT

## 2018-11-12 PROCEDURE — 93005 ELECTROCARDIOGRAM TRACING: CPT

## 2018-11-12 PROCEDURE — 82306 VITAMIN D 25 HYDROXY: CPT | Performed by: FAMILY MEDICINE

## 2018-11-12 PROCEDURE — 99212 OFFICE O/P EST SF 10 MIN: CPT | Performed by: FAMILY MEDICINE

## 2018-11-12 PROCEDURE — 93010 ELECTROCARDIOGRAM REPORT: CPT | Performed by: FAMILY MEDICINE

## 2018-11-12 PROCEDURE — 36415 COLL VENOUS BLD VENIPUNCTURE: CPT

## 2018-11-12 PROCEDURE — 99396 PREV VISIT EST AGE 40-64: CPT | Performed by: FAMILY MEDICINE

## 2018-11-12 PROCEDURE — 81001 URINALYSIS AUTO W/SCOPE: CPT | Performed by: FAMILY MEDICINE

## 2018-11-12 PROCEDURE — 99213 OFFICE O/P EST LOW 20 MIN: CPT | Performed by: FAMILY MEDICINE

## 2018-11-12 RX ORDER — LISINOPRIL 10 MG/1
10 TABLET ORAL DAILY
Qty: 30 TABLET | Refills: 1 | Status: SHIPPED | OUTPATIENT
Start: 2018-11-12 | End: 2019-09-16

## 2018-11-12 NOTE — TELEPHONE ENCOUNTER
Dr. Otf Feng,    [id number] Vianey Bowen (unpaid leave) pending in Forms dept. Pt is requesting a continuous medical leave from 11/2/18 for 12 weeks due to her hand pain. Do you approve? Please advise.     Thank you,  [facility]

## 2018-11-13 NOTE — TELEPHONE ENCOUNTER
Dr. Izabel Landers,    Please sign off on form:  -Highlight the patient and hit \"Chart\" button. -In Chart Review, w/in the Encounter tab - click 1 time on the Telephone call encounter for 10/22/18.  Scroll down the telephone encounter.  -Click \"scan on\" blue

## 2018-11-14 RX ORDER — ERGOCALCIFEROL 1.25 MG/1
50000 CAPSULE ORAL WEEKLY
Qty: 12 CAPSULE | Refills: 4 | Status: SHIPPED | OUTPATIENT
Start: 2018-11-14 | End: 2018-12-14

## 2018-12-12 ENCOUNTER — TELEPHONE (OUTPATIENT)
Dept: FAMILY MEDICINE CLINIC | Facility: CLINIC | Age: 58
End: 2018-12-12

## 2018-12-12 NOTE — TELEPHONE ENCOUNTER
Pt states she received her Mammogram results from [facility] and want the nurse to go over them with her. Pt left phone number to hospital if mammogram results have not been received by provider yet [phone].

## 2019-02-01 DIAGNOSIS — M54.42 ACUTE BILATERAL LOW BACK PAIN WITH LEFT-SIDED SCIATICA: ICD-10-CM

## 2019-02-01 RX ORDER — MELOXICAM 15 MG/1
TABLET ORAL
Qty: 30 TABLET | Refills: 0 | Status: SHIPPED | OUTPATIENT
Start: 2019-02-01 | End: 2019-03-07

## 2019-03-07 DIAGNOSIS — M54.42 ACUTE BILATERAL LOW BACK PAIN WITH LEFT-SIDED SCIATICA: ICD-10-CM

## 2019-03-08 NOTE — TELEPHONE ENCOUNTER
Please advise in regards to refill request. Thank You  Refill Protocol Appointment Criteria  · Appointment scheduled in the past 6 months or in the next 3 months  Recent Outpatient Visits            3 months ago Physical exam    [address]

## 2019-03-12 NOTE — TELEPHONE ENCOUNTER
Second Refill Request, please advise for:    Current Outpatient Medications:   •  MELOXICAM 15 MG Oral Tab, TAKE 1 TABLET(15 MG) BY MOUTH DAILY, Disp: 30 tablet, Rfl: 0

## 2019-03-15 RX ORDER — MELOXICAM 15 MG/1
TABLET ORAL
Qty: 30 TABLET | Refills: 2 | Status: SHIPPED | OUTPATIENT
Start: 2019-03-15 | End: 2019-07-28

## 2019-03-15 NOTE — TELEPHONE ENCOUNTER
Received third request from pharmacy on 3/14/2019 for the following:    •  MELOXICAM 15 MG Oral Tab, TAKE 1 TABLET(15 MG) BY MOUTH DAILY, Disp: 30 tablet, Rfl: 0

## 2019-03-19 ENCOUNTER — NURSE TRIAGE (OUTPATIENT)
Dept: OTHER | Age: 59
End: 2019-03-19

## 2019-03-19 ENCOUNTER — OFFICE VISIT (OUTPATIENT)
Dept: FAMILY MEDICINE CLINIC | Facility: CLINIC | Age: 59
End: 2019-03-19
Payer: COMMERCIAL

## 2019-03-19 VITALS
HEART RATE: 87 BPM | SYSTOLIC BLOOD PRESSURE: 140 MMHG | DIASTOLIC BLOOD PRESSURE: 88 MMHG | BODY MASS INDEX: 37.68 KG/M2 | HEIGHT: 55 IN | WEIGHT: 162.81 LBS | TEMPERATURE: 99 F

## 2019-03-19 DIAGNOSIS — J11.1 INFLUENZA: Primary | ICD-10-CM

## 2019-03-19 PROCEDURE — 99212 OFFICE O/P EST SF 10 MIN: CPT | Performed by: FAMILY MEDICINE

## 2019-03-19 PROCEDURE — 99213 OFFICE O/P EST LOW 20 MIN: CPT | Performed by: FAMILY MEDICINE

## 2019-03-19 RX ORDER — CODEINE PHOSPHATE AND GUAIFENESIN 10; 100 MG/5ML; MG/5ML
5 SOLUTION ORAL EVERY 6 HOURS PRN
Qty: 120 ML | Refills: 1 | Status: SHIPPED | OUTPATIENT
Start: 2019-03-19 | End: 2019-08-07

## 2019-03-19 RX ORDER — OSELTAMIVIR PHOSPHATE 75 MG/1
75 CAPSULE ORAL 2 TIMES DAILY
Qty: 10 CAPSULE | Refills: 0 | Status: SHIPPED | OUTPATIENT
Start: 2019-03-19 | End: 2019-09-16 | Stop reason: ALTCHOICE

## 2019-03-19 NOTE — TELEPHONE ENCOUNTER
Action Requested: Summary for Provider     []  Critical Lab, Recommendations Needed  [] Need Additional Advice  []   FYI    []   Need Orders  [] Need Medications Sent to Pharmacy  []  Other     SUMMARY: Spoke with patient who reports she has been sick sin

## 2019-03-19 NOTE — PROGRESS NOTES
3/19/2019 1:38 PM    Bella Fiedls, : 1960  Patient presents with:  ER F/U: neck pain  Neck Pain  URI: fever 102F, headaches since yesterday; yellow phealm; dry cough; blurry vision; wheezing; dizziness    HPI:     Bella Fields is a 63year old f not have it   • Colon polyps     dx'd in 4/22012   • CTS (carpal tunnel syndrome)    • Diverticulosis    • Esophageal reflux    • Facial mass     benign   • History of blood transfusion 2007   • Measles    • Osteoarthritis    • Visual impairment     Otton[employer] clubs or organizations: Not on file        Relationship status: Not on file      Intimate partner violence:        Fear of current or ex partner: Not on file        Emotionally abused: Not on file        Physically abused: Not on file        Forced sexual Oral Tab TAKE 1 TABLET(10 MG) BY MOUTH THREE TIMES DAILY AS NEEDED FOR MUSCLE SPASMS Disp: 30 tablet Rfl: 0   atorvastatin 10 MG Oral Tab Take 1 tablet (10 mg total) by mouth nightly.  Disp: 90 tablet Rfl: 3     No current facility-administered medications MG) BY MOUTH THREE TIMES DAILY AS NEEDED FOR MUSCLE SPASMS, Disp: 30 tablet, Rfl: 0  •  atorvastatin 10 MG Oral Tab, Take 1 tablet (10 mg total) by mouth nightly., Disp: 90 tablet, Rfl: 3.      RECOMMENDATIONS given include: Please, call our office with any

## 2019-07-28 DIAGNOSIS — M54.42 ACUTE BILATERAL LOW BACK PAIN WITH LEFT-SIDED SCIATICA: ICD-10-CM

## 2019-07-29 RX ORDER — MELOXICAM 15 MG/1
TABLET ORAL
Qty: 90 TABLET | Refills: 1 | Status: SHIPPED | OUTPATIENT
Start: 2019-07-29 | End: 2020-07-15

## 2019-08-07 ENCOUNTER — OFFICE VISIT (OUTPATIENT)
Dept: FAMILY MEDICINE CLINIC | Facility: CLINIC | Age: 59
End: 2019-08-07
Payer: COMMERCIAL

## 2019-08-07 VITALS
DIASTOLIC BLOOD PRESSURE: 68 MMHG | HEART RATE: 71 BPM | HEIGHT: 55 IN | SYSTOLIC BLOOD PRESSURE: 134 MMHG | TEMPERATURE: 98 F | WEIGHT: 167 LBS | BODY MASS INDEX: 38.65 KG/M2

## 2019-08-07 DIAGNOSIS — M76.61 ACHILLES TENDINITIS OF BOTH LOWER EXTREMITIES: Primary | ICD-10-CM

## 2019-08-07 DIAGNOSIS — M17.11 PRIMARY OSTEOARTHRITIS OF RIGHT KNEE: ICD-10-CM

## 2019-08-07 DIAGNOSIS — S39.012A STRAIN OF LUMBAR REGION, INITIAL ENCOUNTER: ICD-10-CM

## 2019-08-07 DIAGNOSIS — M76.62 ACHILLES TENDINITIS OF BOTH LOWER EXTREMITIES: Primary | ICD-10-CM

## 2019-08-07 PROCEDURE — 99213 OFFICE O/P EST LOW 20 MIN: CPT | Performed by: FAMILY MEDICINE

## 2019-08-07 RX ORDER — ERGOCALCIFEROL 1.25 MG/1
CAPSULE ORAL
Refills: 11 | COMMUNITY
Start: 2019-03-13 | End: 2020-03-12

## 2019-08-07 RX ORDER — NAPROXEN SODIUM 550 MG/1
550 TABLET ORAL 2 TIMES DAILY WITH MEALS
Qty: 60 TABLET | Refills: 1 | Status: SHIPPED | OUTPATIENT
Start: 2019-08-07 | End: 2019-08-07

## 2019-08-07 RX ORDER — PREDNISONE 10 MG/1
10 TABLET ORAL DAILY
Qty: 7 TABLET | Refills: 0 | Status: SHIPPED | OUTPATIENT
Start: 2019-08-07 | End: 2019-09-16

## 2019-08-07 RX ORDER — NAPROXEN SODIUM 550 MG/1
550 TABLET ORAL 2 TIMES DAILY WITH MEALS
Qty: 60 TABLET | Refills: 1 | Status: SHIPPED | OUTPATIENT
Start: 2019-08-07 | End: 2019-09-16

## 2019-08-07 RX ORDER — PREDNISONE 10 MG/1
10 TABLET ORAL DAILY
Qty: 7 TABLET | Refills: 0 | Status: SHIPPED | OUTPATIENT
Start: 2019-08-07 | End: 2019-08-07

## 2019-08-07 NOTE — PROGRESS NOTES
8/7/2019  9:17 AM    Bonifacio Moran is a 63year old female.     Chief complaint(s): Patient presents with:  Heel Pain: both f/u  Musculoskeletal Problem: knee swelling and painful x 2 months  Back Pain: lower x 4 days    HPI:     Smith[place]alena Moran primary com 2   • Hypertension Mother    • Breast Cancer Sister 61   • Cancer Sister    • Hypertension Sister       Social History: Social History    Tobacco Use      Smoking status: Never Smoker      Smokeless tobacco: Never Used    Alcohol use:  Yes      Alcohol/week Allergies:    Meperidine              UNKNOWN    Comment:BP dropped and had to be resuscitated  Benzoin                 RASH  Iodine (Topical)        RASH  Hydrocodone             RASH    Comment:Not sure if allergic      ROS:   Review of Systems   C deterioration or worsening of the medical condition. Also, inform the doctor with any new symptoms or medications' side effects. FOLLOW-UP: Schedule a follow-up visit in 4 weeks / prn.            Orders This Visit:  No orders of the defined types were

## 2019-08-08 ENCOUNTER — TELEPHONE (OUTPATIENT)
Dept: FAMILY MEDICINE CLINIC | Facility: CLINIC | Age: 59
End: 2019-08-08

## 2019-08-08 NOTE — TELEPHONE ENCOUNTER
Pt was seen yesterday. Pt called stating she would like to speak with office, she needs new note for work. Her job declined note, note needs to be more specific on restrictions.  Pt states she is doing PT and would like note to have proper date when her PT

## 2019-09-16 ENCOUNTER — OFFICE VISIT (OUTPATIENT)
Dept: FAMILY MEDICINE CLINIC | Facility: CLINIC | Age: 59
End: 2019-09-16
Payer: COMMERCIAL

## 2019-09-16 VITALS
HEART RATE: 67 BPM | WEIGHT: 165.63 LBS | DIASTOLIC BLOOD PRESSURE: 70 MMHG | BODY MASS INDEX: 51 KG/M2 | SYSTOLIC BLOOD PRESSURE: 132 MMHG | TEMPERATURE: 98 F

## 2019-09-16 DIAGNOSIS — G89.29 CHRONIC MIDLINE LOW BACK PAIN WITHOUT SCIATICA: Primary | ICD-10-CM

## 2019-09-16 DIAGNOSIS — M76.61 ACHILLES TENDINITIS OF BOTH LOWER EXTREMITIES: ICD-10-CM

## 2019-09-16 DIAGNOSIS — R30.0 DYSURIA: ICD-10-CM

## 2019-09-16 DIAGNOSIS — M17.11 PRIMARY OSTEOARTHRITIS OF RIGHT KNEE: ICD-10-CM

## 2019-09-16 DIAGNOSIS — M76.62 ACHILLES TENDINITIS OF BOTH LOWER EXTREMITIES: ICD-10-CM

## 2019-09-16 DIAGNOSIS — M54.50 CHRONIC MIDLINE LOW BACK PAIN WITHOUT SCIATICA: Primary | ICD-10-CM

## 2019-09-16 LAB
APPEARANCE: CLEAR
BILIRUBIN: NEGATIVE
GLUCOSE (URINE DIPSTICK): NEGATIVE MG/DL
KETONES (URINE DIPSTICK): NEGATIVE MG/DL
LEUKOCYTES: NEGATIVE
MULTISTIX LOT#: NORMAL NUMERIC
NITRITE, URINE: NEGATIVE
PH, URINE: 7 (ref 4.5–8)
PROTEIN (URINE DIPSTICK): NEGATIVE MG/DL
SPECIFIC GRAVITY: 1.02 (ref 1–1.03)
URINE-COLOR: YELLOW
UROBILINOGEN,SEMI-QN: 0.2 MG/DL (ref 0–1.9)

## 2019-09-16 PROCEDURE — 99213 OFFICE O/P EST LOW 20 MIN: CPT | Performed by: FAMILY MEDICINE

## 2019-09-16 PROCEDURE — 81003 URINALYSIS AUTO W/O SCOPE: CPT | Performed by: FAMILY MEDICINE

## 2019-09-16 NOTE — PROGRESS NOTES
9/16/2019  10:20 AM    Vel Goldsmith is a 63year old female. Chief complaint(s): Patient presents with:   Follow - Up: F/u after PT for achilles tendinitis   Back pain, knee pain, heel pain  Dysuria  HPI:     Vel Goldsmith primary complaint is regardi Surgical History:   Procedure Laterality Date   • BACK SURGERY     • BENIGN BIOPSY RIGHT     • BREAST BIOPSY     •       x2   • CARPAL TUNNEL RELEASE     • CATARACT     • CORRECT BUNION,OTHR METHODS     • EXCISION LESION HEAD/NECK/FACE Left 10/11/ CYCLOBENZAPRINE HCL 10 MG Oral Tab TAKE 1 TABLET(10 MG) BY MOUTH THREE TIMES DAILY AS NEEDED FOR MUSCLE SPASMS Disp: 30 tablet Rfl: 0   atorvastatin 10 MG Oral Tab Take 1 tablet (10 mg total) by mouth nightly.  Disp: 90 tablet Rfl: 3   ergocalciferol 5000 1. 030    Blood Urine TRACE Negative    PH Urine 7.0 4.5 - 8.0    Protein Urine NEGATIVE Negative/Trace mg/dL    Urobilinogen Urine 0.2 0.0 - 1.9 mg/dL    Nitrite Urine NEGATIVE Negative    Leukocyte Esterase Urine NEGATIVE Negative    APPEARANCE CLEAR Charis develop.  Schedule a follow-up appointment in  prn.  -   Orders This Visit:  Orders Placed This Encounter      POC Urinalysis, Automated Dip without microscopy (PCA and EMMG ONLY) [56595]      Meds This Visit:  Requested Prescriptions     Signed Prescriptio

## 2019-09-18 ENCOUNTER — TELEPHONE (OUTPATIENT)
Dept: FAMILY MEDICINE CLINIC | Facility: CLINIC | Age: 59
End: 2019-09-18

## 2019-09-18 NOTE — TELEPHONE ENCOUNTER
PA for Diclofenac Sodium 1% gel completed with [facility] via [address] [employer]. PA approved effective 09/18/2019 – 09/17/2022. [facility] pharmacy notified of approval, pharmacy will notify patient when medication is ready for .

## 2019-09-27 ENCOUNTER — TELEPHONE (OUTPATIENT)
Dept: FAMILY MEDICINE CLINIC | Facility: CLINIC | Age: 59
End: 2019-09-27

## 2019-09-27 NOTE — TELEPHONE ENCOUNTER
Armenian Speaking - Pt is requesting a note for work inquiring that she no longer has any restrictions to perform job duties. She requested 's note faxed over to her job fax# [phone]. She needs the note asap so she can work overtime at her job.     Pl

## 2019-11-25 ENCOUNTER — OFFICE VISIT (OUTPATIENT)
Dept: FAMILY MEDICINE CLINIC | Facility: CLINIC | Age: 59
End: 2019-11-25
Payer: COMMERCIAL

## 2019-11-25 ENCOUNTER — LAB ENCOUNTER (OUTPATIENT)
Dept: LAB | Age: 59
End: 2019-11-25
Attending: FAMILY MEDICINE
Payer: COMMERCIAL

## 2019-11-25 VITALS
TEMPERATURE: 98 F | SYSTOLIC BLOOD PRESSURE: 109 MMHG | HEART RATE: 65 BPM | BODY MASS INDEX: 37.54 KG/M2 | HEIGHT: 55 IN | WEIGHT: 162.19 LBS | DIASTOLIC BLOOD PRESSURE: 69 MMHG

## 2019-11-25 DIAGNOSIS — Z00.00 PHYSICAL EXAM: Primary | ICD-10-CM

## 2019-11-25 DIAGNOSIS — Z00.00 PHYSICAL EXAM: ICD-10-CM

## 2019-11-25 DIAGNOSIS — R05.8 POST-VIRAL COUGH SYNDROME: ICD-10-CM

## 2019-11-25 DIAGNOSIS — Z12.11 COLON CANCER SCREENING: ICD-10-CM

## 2019-11-25 PROCEDURE — 80053 COMPREHEN METABOLIC PANEL: CPT

## 2019-11-25 PROCEDURE — 81001 URINALYSIS AUTO W/SCOPE: CPT | Performed by: FAMILY MEDICINE

## 2019-11-25 PROCEDURE — 81015 MICROSCOPIC EXAM OF URINE: CPT | Performed by: FAMILY MEDICINE

## 2019-11-25 PROCEDURE — 85025 COMPLETE CBC W/AUTO DIFF WBC: CPT

## 2019-11-25 PROCEDURE — 82306 VITAMIN D 25 HYDROXY: CPT | Performed by: FAMILY MEDICINE

## 2019-11-25 PROCEDURE — 86304 IMMUNOASSAY TUMOR CA 125: CPT

## 2019-11-25 PROCEDURE — 83036 HEMOGLOBIN GLYCOSYLATED A1C: CPT

## 2019-11-25 PROCEDURE — 80061 LIPID PANEL: CPT

## 2019-11-25 PROCEDURE — 99396 PREV VISIT EST AGE 40-64: CPT | Performed by: FAMILY MEDICINE

## 2019-11-25 PROCEDURE — 36415 COLL VENOUS BLD VENIPUNCTURE: CPT

## 2019-11-25 PROCEDURE — 84443 ASSAY THYROID STIM HORMONE: CPT

## 2019-11-25 RX ORDER — ATORVASTATIN CALCIUM 10 MG/1
10 TABLET, FILM COATED ORAL NIGHTLY
Qty: 90 TABLET | Refills: 3 | Status: SHIPPED | OUTPATIENT
Start: 2019-11-25 | End: 2020-03-12

## 2019-11-25 RX ORDER — MELOXICAM 15 MG/1
TABLET ORAL
Qty: 90 TABLET | Refills: 1 | Status: CANCELLED | OUTPATIENT
Start: 2019-11-25

## 2019-11-25 RX ORDER — CYCLOBENZAPRINE HCL 10 MG
TABLET ORAL
Qty: 30 TABLET | Refills: 0 | Status: CANCELLED | OUTPATIENT
Start: 2019-11-25

## 2019-11-25 RX ORDER — BENZONATATE 200 MG/1
200 CAPSULE ORAL 3 TIMES DAILY PRN
Qty: 21 CAPSULE | Refills: 0 | Status: SHIPPED | OUTPATIENT
Start: 2019-11-25 | End: 2020-01-09 | Stop reason: ALTCHOICE

## 2019-11-25 NOTE — PROGRESS NOTES
11/25/2019  9:09 AM    Natividad Jain is a 63year old female. Chief complaint(s): Patient presents with:  Routine Physical: last mammo 1/7/19; pap smear    HPI:     Natividad Jain primary complaint is regarding CPE.      Marily Camilo a 63year old  Onset   • Breast Cancer Other 53        breast cancer   • Cancer Other    • Asthma Father    • Diabetes Mother         type 2   • Hypertension Mother    • Breast Cancer Sister 61   • Cancer Sister    • Hypertension Sister       Social History: Social Histo Allergies:    Meperidine              UNKNOWN    Comment:BP dropped and had to be resuscitated  Benzoin                 RASH  Iodine (Topical)        RASH  Hydrocodone             RASH    Comment:Not sure if allergic      ROS:   Review of Systems   C no friction rub. No murmur heard. Pulmonary/Chest: No respiratory distress. Right breast exhibits no mass, no nipple discharge and no skin change. Left breast exhibits no mass, no nipple discharge and no skin change.    Lungs clear to auscultation bilat ([id number]) . IMMUNIZATIONS ordered and given today include: none.     RECOMMENDATIONS given include: ANTICIPATORY GUIDANCE  topics covered today include: safety (i.e. seat belts, helmets, sunscreen, protective sports gear ), nutrition (i.e. healthy meal (three) times daily as needed for cough. • hydrocortisone 2.5 % External Cream 15 g 0     Sig: Apply 1 Application topically 2 (two) times daily.        Imaging & Referrals:  EVALUATE & TREAT, GASTRO (INTERNAL)  THAIS SCREENING BILAT (CPT=77067)         MOSES

## 2019-11-27 RX ORDER — ERGOCALCIFEROL 1.25 MG/1
50000 CAPSULE ORAL WEEKLY
Qty: 12 CAPSULE | Refills: 4 | Status: SHIPPED | OUTPATIENT
Start: 2019-11-27 | End: 2019-12-27

## 2019-12-03 ENCOUNTER — TELEPHONE (OUTPATIENT)
Dept: FAMILY MEDICINE CLINIC | Facility: CLINIC | Age: 59
End: 2019-12-03

## 2019-12-03 NOTE — TELEPHONE ENCOUNTER
Pt returning call. With  Kera Dudley ID# [id number]  Patient informed (Name and  verified) of test results/recommendations. Patient voiced understanding and agrees with plan of care.      Notes recorded by Tarik Bautista on 12/3/2019 at 8:43

## 2020-01-08 NOTE — LETTER
5/27/2020          To Whom It May Concern:    Bonifacio Moran is currently under my medical care. Millie Jha may return to work at this time but is restricted and can only work 30 hours per week for 3 weeks.     If you require additional information please cont
None

## 2020-01-09 ENCOUNTER — OFFICE VISIT (OUTPATIENT)
Dept: FAMILY MEDICINE CLINIC | Facility: CLINIC | Age: 60
End: 2020-01-09
Payer: COMMERCIAL

## 2020-01-09 VITALS
SYSTOLIC BLOOD PRESSURE: 139 MMHG | DIASTOLIC BLOOD PRESSURE: 80 MMHG | HEART RATE: 77 BPM | BODY MASS INDEX: 50 KG/M2 | WEIGHT: 165 LBS | TEMPERATURE: 98 F

## 2020-01-09 DIAGNOSIS — J11.1 INFLUENZA: Primary | ICD-10-CM

## 2020-01-09 PROCEDURE — 99213 OFFICE O/P EST LOW 20 MIN: CPT | Performed by: FAMILY MEDICINE

## 2020-01-09 RX ORDER — OSELTAMIVIR PHOSPHATE 75 MG/1
CAPSULE ORAL
COMMUNITY
Start: 2020-01-06 | End: 2020-07-15

## 2020-01-09 RX ORDER — CODEINE PHOSPHATE AND GUAIFENESIN 10; 100 MG/5ML; MG/5ML
5 SOLUTION ORAL EVERY 6 HOURS PRN
Qty: 120 ML | Refills: 1 | Status: SHIPPED | OUTPATIENT
Start: 2020-01-09 | End: 2020-03-12

## 2020-01-09 RX ORDER — OXYBUTYNIN CHLORIDE 5 MG/1
TABLET ORAL
COMMUNITY
Start: 2020-01-06 | End: 2020-07-15

## 2020-01-09 NOTE — PROGRESS NOTES
2020 12:19 PM    Kaylee Collazo, : 1960  Patient presents with:  Urgent Care F/u: urgent care F/u c/o dizziness, headache, watery eyes, cough, fever, chiils, body aches,   Influenza: +Influenza A      HPI:     Kaylee Collazo is a 63year old fem History:   Diagnosis Date   • Anxiety    • Bronchitis    • Chicken pox     per patient did not have it   • Colon polyps     dx'd in 4/22012   • CTS (carpal tunnel syndrome)    • Diverticulosis    • Esophageal reflux    • Facial mass     benign   • History service: Not on file        Active member of club or organization: Not on file        Attends meetings of clubs or organizations: Not on file        Relationship status: Not on file      Intimate partner violence:        Fear of current or ex partner: Not  (90 Base) MCG/ACT Inhalation Aero Soln, Inhale 2 puffs into the lungs every 6 (six) hours as needed. Make appt, Disp: 1 Inhaler, Rfl: 0  atorvastatin 10 MG Oral Tab, Take 1 tablet (10 mg total) by mouth nightly.  (Patient not taking: Reported on 1/9 MEDICATIONS: •  Oseltamivir Phosphate 75 MG Oral Cap, TK 1 C PO BID FOR INFECTION, Disp: , Rfl:   •  VIRTUSSIN A/C 100-10 MG/5ML Oral Solution, TK 10 ML PO QID PRF COUGH, Disp: , Rfl:   •  MISC NATURAL PRODUCT OP, Take by mouth.  , Disp: , Rfl:   • (six) hours as needed for cough. Dispense:  120 mL          Refill:  1      Follow Up with:  No follow-up provider specified. Betsey Don MD    All results reviewed and discussed with patient.   See AVS for detailed discharge instructions

## 2020-03-11 ENCOUNTER — NURSE TRIAGE (OUTPATIENT)
Dept: FAMILY MEDICINE CLINIC | Facility: CLINIC | Age: 60
End: 2020-03-11

## 2020-03-11 NOTE — TELEPHONE ENCOUNTER
Phone call made, spoke with patient.  Please call patient to set up an appointment with me for tomorrow at 9 am.Thanks

## 2020-03-11 NOTE — TELEPHONE ENCOUNTER
Spoke with pt and MD message below given. Pt verb understanding and agrees to plan. Pt added to schedule.

## 2020-03-11 NOTE — TELEPHONE ENCOUNTER
Omani speaking patient is requesting an order for Xrays, states she fell and is having discomfort in her ribs. hospitals advised patient she would be have to be seen by Dr since he is unaware of situation.  Patient states it is hard for her to come down to [facility]

## 2020-03-11 NOTE — TELEPHONE ENCOUNTER
Action Requested: Summary for Provider     []  Critical Lab, Recommendations Needed  [] Need Additional Advice  []   FYI    []   Need Orders  [] Need Medications Sent to Pharmacy  []  Other     SUMMARY: Pt c/o rib pain from fall 3/1/20 tripped falling onto

## 2020-03-12 ENCOUNTER — HOSPITAL ENCOUNTER (OUTPATIENT)
Dept: GENERAL RADIOLOGY | Age: 60
Discharge: HOME | End: 2020-03-12
Attending: FAMILY MEDICINE
Payer: COMMERCIAL

## 2020-03-12 ENCOUNTER — OFFICE VISIT (OUTPATIENT)
Dept: FAMILY MEDICINE CLINIC | Facility: CLINIC | Age: 60
End: 2020-03-12
Payer: COMMERCIAL

## 2020-03-12 ENCOUNTER — TELEPHONE (OUTPATIENT)
Dept: FAMILY MEDICINE CLINIC | Facility: CLINIC | Age: 60
End: 2020-03-12

## 2020-03-12 VITALS
HEART RATE: 56 BPM | BODY MASS INDEX: 37.03 KG/M2 | DIASTOLIC BLOOD PRESSURE: 79 MMHG | SYSTOLIC BLOOD PRESSURE: 127 MMHG | TEMPERATURE: 98 F | WEIGHT: 160 LBS | HEIGHT: 55 IN

## 2020-03-12 DIAGNOSIS — R07.81 RIB PAIN ON LEFT SIDE: Primary | ICD-10-CM

## 2020-03-12 DIAGNOSIS — R07.81 RIB PAIN ON LEFT SIDE: ICD-10-CM

## 2020-03-12 PROCEDURE — 99213 OFFICE O/P EST LOW 20 MIN: CPT | Performed by: FAMILY MEDICINE

## 2020-03-12 PROCEDURE — 71100 X-RAY EXAM RIBS UNI 2 VIEWS: CPT | Performed by: FAMILY MEDICINE

## 2020-03-12 RX ORDER — TEA TREE OIL 100 %
OIL (ML) TOPICAL
Qty: 180 CAPSULE | Refills: 3 | Status: SHIPPED | OUTPATIENT
Start: 2020-03-12 | End: 2020-12-08

## 2020-03-12 RX ORDER — ERGOCALCIFEROL 1.25 MG/1
CAPSULE ORAL
Qty: 12 CAPSULE | Refills: 1 | Status: SHIPPED | OUTPATIENT
Start: 2020-03-12 | End: 2020-09-17

## 2020-03-12 NOTE — PROGRESS NOTES
3/12/2020  9:44 AM    Felipe Husain is a 63year old female. Chief complaint(s): Patient presents with:  Fall: Pt fell down at home and she injured left ribcage on 3/1/20. Pt is requesting xrays x pain and bruises.   Medication Eval: Reminded to pt brin Yes      Alcohol/week: 0.0 standard drinks      Frequency: 2-4 times a month      Comment: sometimes/social basis only-beer    Drug use: No       Immunizations:     Immunization History  Administered            Date(s) Administered    Flulaval, 3 Years & > Negative for shortness of breath. Cardiovascular: Negative for chest pain and palpitations. Left ribs pain   Musculoskeletal: Negative for back pain. Neurological: Negative for headaches. Psychiatric/Behavioral: Negative for depressed mood. Disp Refills   • ergocalciferol 1.25 MG (23161 UT) Oral Cap 12 capsule 1     Sig: TK 1 C PO ONCE WEEKLY   • Diclofenac Sodium 1 % Transdermal Gel 60 g 2     Sig: Apply 4 g topically 4 (four) times daily. Apply to affected area(s).    • Misc Natural Products

## 2020-03-12 NOTE — TELEPHONE ENCOUNTER
Called back to patient about a work note for light duties requested by pt. Nimesh. If she will needed. MD stated, she doesn't have any restrictions back to work since there are no fractures.

## 2020-05-12 ENCOUNTER — NURSE TRIAGE (OUTPATIENT)
Dept: FAMILY MEDICINE CLINIC | Facility: CLINIC | Age: 60
End: 2020-05-12

## 2020-05-12 NOTE — TELEPHONE ENCOUNTER
Action Requested: Summary for Provider     []  Critical Lab, Recommendations Needed  [] Need Additional Advice  []   FYI    []   Need Orders  [] Need Medications Sent to Pharmacy  []  Other     SUMMARY: patient advised appt today.  She declined due to trans

## 2020-05-13 ENCOUNTER — TELEPHONE (OUTPATIENT)
Dept: PODIATRY CLINIC | Facility: CLINIC | Age: 60
End: 2020-05-13

## 2020-05-13 ENCOUNTER — OFFICE VISIT (OUTPATIENT)
Dept: FAMILY MEDICINE CLINIC | Facility: CLINIC | Age: 60
End: 2020-05-13
Payer: COMMERCIAL

## 2020-05-13 VITALS
SYSTOLIC BLOOD PRESSURE: 136 MMHG | BODY MASS INDEX: 37.26 KG/M2 | DIASTOLIC BLOOD PRESSURE: 72 MMHG | HEART RATE: 74 BPM | HEIGHT: 55 IN | WEIGHT: 161 LBS

## 2020-05-13 DIAGNOSIS — G62.9 NEUROPATHY: ICD-10-CM

## 2020-05-13 DIAGNOSIS — M79.672 PAIN OF BOTH HEELS: Primary | ICD-10-CM

## 2020-05-13 DIAGNOSIS — M79.671 PAIN OF BOTH HEELS: Primary | ICD-10-CM

## 2020-05-13 PROCEDURE — 99213 OFFICE O/P EST LOW 20 MIN: CPT | Performed by: FAMILY MEDICINE

## 2020-05-13 RX ORDER — L-METHYLFOLATE-ALGAE-VIT B12-B6 CAP 3-90.314-2-35 MG 3-90.314-2-35 MG
2 CAP ORAL DAILY
Qty: 180 CAPSULE | Refills: 1 | Status: SHIPPED | OUTPATIENT
Start: 2020-05-13 | End: 2020-06-12

## 2020-05-13 RX ORDER — ACETAMINOPHEN 325 MG/1
325 TABLET ORAL EVERY 6 HOURS PRN
COMMUNITY
End: 2021-12-06

## 2020-05-13 RX ORDER — PREDNISONE 20 MG/1
TABLET ORAL
COMMUNITY
Start: 2020-05-12 | End: 2020-09-17

## 2020-05-13 RX ORDER — MELOXICAM 15 MG/1
15 TABLET ORAL DAILY
Qty: 90 TABLET | Refills: 1 | Status: SHIPPED | OUTPATIENT
Start: 2020-05-13 | End: 2020-12-08

## 2020-05-13 NOTE — TELEPHONE ENCOUNTER
Per pt is being referred by PCP for Pain of both heels. Wanting to go to [facility] and is being advised to be seen as soon as possible.  Please advise

## 2020-05-13 NOTE — TELEPHONE ENCOUNTER
Called lang line and s/w Casie hi, [phone] for Slovenian translation- she called pt and pt states bilateral heel pain. She denies any injury. Pain started 1 yr ago, but pain got worse the past month and severe the past 2 days. Pt rates her pain pain 8/10 an

## 2020-05-13 NOTE — PROGRESS NOTES
5/13/2020  9:57 AM    Jillian Vargas is a 62year old female. Chief complaint(s): Patient presents with: Other: pain in feet  Burning Feet    HPI:     Jillian Vargas primary complaint is regarding as above.      Patient is a 56-year-old female with long 0.0 standard drinks      Frequency: 2-4 times a month      Comment: sometimes/social basis only-beer    Drug use: No       Immunizations:     Immunization History  Administered            Date(s) Administered    Flulaval, 3 Years & >, IM 3/12/2020 ) 1 Inhaler 0       Allergies:    Meperidine              UNKNOWN    Comment:BP dropped and had to be resuscitated  Benzoin                 RASH  Iodine (Topical)        RASH  Hydrocodone             RASH    Comment:Not sure if allergic      ROS: the doctor with any new symptoms or medications' side effects. RICE therapy. FOLLOW-UP: Schedule a follow-up visit in 2 months /prn. Orders This Visit:  No orders of the defined types were placed in this encounter.       Meds This Visit:  Requ

## 2020-05-27 ENCOUNTER — OFFICE VISIT (OUTPATIENT)
Dept: PODIATRY CLINIC | Facility: CLINIC | Age: 60
End: 2020-05-27
Payer: COMMERCIAL

## 2020-05-27 VITALS — TEMPERATURE: 98 F

## 2020-05-27 DIAGNOSIS — M79.672 LEFT FOOT PAIN: Primary | ICD-10-CM

## 2020-05-27 DIAGNOSIS — M77.31 CALCANEAL SPUR OF BOTH FEET: ICD-10-CM

## 2020-05-27 DIAGNOSIS — M77.32 CALCANEAL SPUR OF BOTH FEET: ICD-10-CM

## 2020-05-27 DIAGNOSIS — M76.61 ACHILLES TENDINITIS OF BOTH LOWER EXTREMITIES: ICD-10-CM

## 2020-05-27 DIAGNOSIS — M79.671 RIGHT FOOT PAIN: ICD-10-CM

## 2020-05-27 DIAGNOSIS — M76.62 ACHILLES TENDINITIS OF BOTH LOWER EXTREMITIES: ICD-10-CM

## 2020-05-27 PROCEDURE — 99203 OFFICE O/P NEW LOW 30 MIN: CPT | Performed by: PODIATRIST

## 2020-05-27 RX ORDER — METHYLPREDNISOLONE 4 MG/1
TABLET ORAL
Qty: 1 PACKAGE | Refills: 0 | Status: SHIPPED | OUTPATIENT
Start: 2020-05-27 | End: 2020-07-15

## 2020-05-27 NOTE — PROGRESS NOTES
Flavia Yousif is a 62year old female. Patient presents with:  New Patient: bilateral foot pain - has had heel pain for about 5 years - pain scale 10/10 - takes tylenol as needed.         HPI:   Patient presents to the clinic at today's visit complaining o area(s). 60 g 1   • MELOXICAM 15 MG Oral Tab TAKE 1 TABLET(15 MG) BY MOUTH DAILY 90 tablet 1   • PROAIR  (90 Base) MCG/ACT Inhalation Aero Soln Inhale 2 puffs into the lungs every 6 (six) hours as needed.  Make appt 1 Inhaler 0      Past Medical Hist SYSTEMS:   Today reviewed systens as documented below  GENERAL HEALTH: feels well otherwise  SKIN: Refer to exam below  RESPIRATORY: denies shortness of breath with exertion  CARDIOVASCULAR: denies chest pain on exertion  GI: denies abdominal pain and anand PHYSICAL THERAPY EXTERNAL    Achilles tendinitis of both lower extremities  -     US ANKLE BILATERAL LIMITED (EUS=19574); Future  -     OP REFERRAL TO [facility]  -     methylPREDNISolone 4 MG Oral Tablet Therapy Pack;  Take per package

## 2020-05-28 ENCOUNTER — TELEPHONE (OUTPATIENT)
Dept: PODIATRY CLINIC | Facility: CLINIC | Age: 60
End: 2020-05-28

## 2020-05-28 NOTE — TELEPHONE ENCOUNTER
Called oRmelia from [facility] and spoke to Hakeem Lowry and informed her of Evens's message.

## 2020-05-28 NOTE — TELEPHONE ENCOUNTER
Spoke to pt and instructed her to see Dr. Bhaart Sam at [facility] to schedule US. Pt verbalized understanding.

## 2020-05-28 NOTE — TELEPHONE ENCOUNTER
Returned Romelia's call from [facility] stating that pt is wanting to have [address] done there and is that okay with Dr. Jeanie Long. Also, asking if Evens wants US of ankles as ordered or of pt's feet?    Informed Romelia that I will have to ask Evens and call

## 2020-05-29 ENCOUNTER — TELEPHONE (OUTPATIENT)
Dept: GASTROENTEROLOGY | Facility: CLINIC | Age: 60
End: 2020-05-29

## 2020-05-29 NOTE — TELEPHONE ENCOUNTER
Ms. Alyson Montes was a no-show for appointment today, likely due to rescheduling confusion during the COVID-19 pandemic. I called telephone number [phone] today and reached voicemail, left a nondetailed message. GI RNs, please try and reach Ms. Alyson Montes

## 2020-06-17 NOTE — TELEPHONE ENCOUNTER
Dr Prashant Nicholas,    I made several attempts to reach this patient    No response letter mailed to the pt    Encounter closed

## 2020-07-14 ENCOUNTER — NURSE TRIAGE (OUTPATIENT)
Dept: FAMILY MEDICINE CLINIC | Facility: CLINIC | Age: 60
End: 2020-07-14

## 2020-07-14 NOTE — TELEPHONE ENCOUNTER
Action Requested: Summary for Provider     []  Critical Lab, Recommendations Needed  [] Need Additional Advice  [x]   FYI    []   Need Orders  [] Need Medications Sent to Pharmacy  []  Other     SUMMARY:   Spoke with pt, GIOVANY verified, she stated she missed

## 2020-07-15 ENCOUNTER — VIRTUAL PHONE E/M (OUTPATIENT)
Dept: FAMILY MEDICINE CLINIC | Facility: CLINIC | Age: 60
End: 2020-07-15
Payer: COMMERCIAL

## 2020-07-15 DIAGNOSIS — N95.0 POSTMENOPAUSAL BLEEDING: Primary | ICD-10-CM

## 2020-07-15 PROCEDURE — 99214 OFFICE O/P EST MOD 30 MIN: CPT | Performed by: FAMILY MEDICINE

## 2020-07-15 NOTE — PROGRESS NOTES
Virtual Telephone Check-In    Bella Fields verbally consents to a Virtual/Telephone Check-In visit on 07/15/20. Patient has been referred to the [facility] website at [email or web address] to review the yearly Consent to Treat document.     Patient Mary Díaz

## 2020-07-30 ENCOUNTER — NURSE TRIAGE (OUTPATIENT)
Dept: FAMILY MEDICINE CLINIC | Facility: CLINIC | Age: 60
End: 2020-07-30

## 2020-07-30 ENCOUNTER — VIRTUAL PHONE E/M (OUTPATIENT)
Dept: FAMILY MEDICINE CLINIC | Facility: CLINIC | Age: 60
End: 2020-07-30

## 2020-07-30 DIAGNOSIS — R05.9 COUGH: Primary | ICD-10-CM

## 2020-07-30 PROCEDURE — 99213 OFFICE O/P EST LOW 20 MIN: CPT | Performed by: FAMILY MEDICINE

## 2020-07-30 NOTE — PROGRESS NOTES
Virtual Telephone Check-In    Malini Verdugo verbally consents to a Virtual/Telephone Check-In visit on 07/30/20. Patient has been referred to the [facility] website at [email or web address] to review the yearly Consent to Treat document.     Patient Sunday Muir

## 2020-07-30 NOTE — TELEPHONE ENCOUNTER
Action Requested: Summary for Provider     []  Critical Lab, Recommendations Needed  [] Need Additional Advice  []   FYI    []   Need Orders  [] Need Medications Sent to Pharmacy  []  Other     SUMMARY: Pt stated that she started to have a headache at work

## 2020-07-31 ENCOUNTER — LAB ENCOUNTER (OUTPATIENT)
Dept: LAB | Facility: HOSPITAL | Age: 60
End: 2020-07-31
Attending: FAMILY MEDICINE
Payer: COMMERCIAL

## 2020-07-31 DIAGNOSIS — R05.9 COUGH: ICD-10-CM

## 2020-08-01 RX ORDER — OXYBUTYNIN CHLORIDE 5 MG/1
TABLET ORAL
Qty: 120 ML | Refills: 0 | OUTPATIENT
Start: 2020-08-01

## 2020-08-02 LAB — SARS-COV-2 BY PCR: DETECTED

## 2020-08-03 ENCOUNTER — TELEPHONE (OUTPATIENT)
Dept: FAMILY MEDICINE CLINIC | Facility: CLINIC | Age: 60
End: 2020-08-03

## 2020-08-03 NOTE — TELEPHONE ENCOUNTER
Patient calling to follow up on earlier call. States she is confused if she should take Ibuprofen or Tylenol for headache. Advised it would be best to take Tylenol, especially if she develops a fever. She verbalized understanding.      Also advised that s

## 2020-08-03 NOTE — TELEPHONE ENCOUNTER
Patient states her employer is asking for a copy of her positive Covid test, requesting results to be faxed to [facility] [address] at [phone]. Please advise.

## 2020-08-03 NOTE — PROGRESS NOTES
With [employer] #[id number], left message to call back. No MyChart. See Telephone acute encounter 8/3/20. Already addressed. This nurse instructed [employer] to call patient back and leave a message to disregard the voice mail.     Jhonny,

## 2020-08-04 NOTE — TELEPHONE ENCOUNTER
Patient and roommate on the phone requesting for results to be faxed to employer again.  Also requesting a copy sent to [address] [place] [address], [facility]

## 2020-08-04 NOTE — TELEPHONE ENCOUNTER
Cymro speaking - Pt called in and states her job did not receive fax. Can we please re fax [phone] Attention Pooja.  Please advise

## 2020-08-10 NOTE — TELEPHONE ENCOUNTER
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  I asked patient

## 2020-08-10 NOTE — TELEPHONE ENCOUNTER
Patients roommate Neil Lacy requesting a copy of positive results to be sent to address below,  Requesting call once sent [phone]

## 2020-08-20 ENCOUNTER — NURSE TRIAGE (OUTPATIENT)
Dept: FAMILY MEDICINE CLINIC | Facility: CLINIC | Age: 60
End: 2020-08-20

## 2020-08-20 NOTE — TELEPHONE ENCOUNTER
Action Requested: Summary for Provider     []  Critical Lab, Recommendations Needed  [x] Need Additional Advice  []   FYI    []   Need Orders  [] Need Medications Sent to Pharmacy  []  Other     SUMMARY: Dr Jen Funes, patient covid positive and discharged f

## 2020-08-24 ENCOUNTER — VIRTUAL PHONE E/M (OUTPATIENT)
Dept: FAMILY MEDICINE CLINIC | Facility: CLINIC | Age: 60
End: 2020-08-24
Payer: COMMERCIAL

## 2020-08-24 DIAGNOSIS — U07.1 COVID-19 VIRUS INFECTION: Primary | ICD-10-CM

## 2020-08-24 PROCEDURE — 99213 OFFICE O/P EST LOW 20 MIN: CPT | Performed by: FAMILY MEDICINE

## 2020-08-24 RX ORDER — ASCORBIC ACID 250 MG
2 TABLET,CHEWABLE ORAL DAILY
Qty: 60 TABLET | Refills: 1 | Status: SHIPPED | OUTPATIENT
Start: 2020-08-24 | End: 2020-09-23

## 2020-08-24 RX ORDER — DEXAMETHASONE 6 MG/1
TABLET ORAL
COMMUNITY
Start: 2020-08-11 | End: 2020-08-24 | Stop reason: ALTCHOICE

## 2020-08-24 NOTE — PROGRESS NOTES
Virtual Telephone Check-In    Flavia Body verbally consents to a Virtual/Telephone Check-In visit on 08/24/20. Patient has been referred to the [facility] website at [email or web address] to review the yearly Consent to Treat document.     Patient Demi Rosenthalrosalba any new symptoms or medications' side effects. Increase po intake. FOLLOW-UP: Schedule a follow-up visit in  prn.         Baljeet Contreras MD

## 2020-08-27 ENCOUNTER — TELEPHONE (OUTPATIENT)
Dept: FAMILY MEDICINE CLINIC | Facility: CLINIC | Age: 60
End: 2020-08-27

## 2020-08-27 NOTE — TELEPHONE ENCOUNTER
Please call pt and advise she can find a public testing site near her at [email or web address]. Pt does not have MyChart.

## 2020-08-27 NOTE — TELEPHONE ENCOUNTER
Algerian speaking - pt stts she needs a letter saying she will not be returning to work. Is aware we mailed letter but would like letter faxed to # [phone].  Please advise

## 2020-08-27 NOTE — TELEPHONE ENCOUNTER
With [employer]  Juana Fruits ID# [id number]    Identified  patient's name and     Patient calling reports had been COVID  +  On 2020;  States she still has headache and feels weak , she cannot go to work feeling like this         Had phone

## 2020-08-28 NOTE — TELEPHONE ENCOUNTER
Spoke to patient and advised her that she will need to contact the dept of health to have COVID test done

## 2020-09-16 ENCOUNTER — TELEPHONE (OUTPATIENT)
Dept: FAMILY MEDICINE CLINIC | Facility: CLINIC | Age: 60
End: 2020-09-16

## 2020-09-16 NOTE — TELEPHONE ENCOUNTER
Advised patient of Dr. Christofer Cardona note. Patient verbalized understanding  Patient refused emergency room. Patient  states she does not have no one to  her.    Per patient, she also stated last time she went to the ER she was told to not come in unti

## 2020-09-16 NOTE — TELEPHONE ENCOUNTER
Pt calling for OV stated she she was positive for covid in July , having issues with breathing at times, especially with house work, tires easily, feels something is in her chest, no appt available for tele visit , pt asking for CXR

## 2020-09-17 ENCOUNTER — VIRTUAL PHONE E/M (OUTPATIENT)
Dept: FAMILY MEDICINE CLINIC | Facility: CLINIC | Age: 60
End: 2020-09-17
Payer: COMMERCIAL

## 2020-09-17 ENCOUNTER — TELEPHONE (OUTPATIENT)
Dept: FAMILY MEDICINE CLINIC | Facility: CLINIC | Age: 60
End: 2020-09-17

## 2020-09-17 DIAGNOSIS — G93.3 POST VIRAL SYNDROME: Primary | ICD-10-CM

## 2020-09-17 DIAGNOSIS — U07.1 COVID-19: ICD-10-CM

## 2020-09-17 PROCEDURE — 99213 OFFICE O/P EST LOW 20 MIN: CPT | Performed by: FAMILY MEDICINE

## 2020-09-17 RX ORDER — ERGOCALCIFEROL 1.25 MG/1
CAPSULE ORAL
Qty: 12 CAPSULE | Refills: 1 | Status: SHIPPED | OUTPATIENT
Start: 2020-09-17 | End: 2021-04-09

## 2020-09-17 RX ORDER — BENZONATATE 200 MG/1
200 CAPSULE ORAL 3 TIMES DAILY PRN
Qty: 30 CAPSULE | Refills: 0 | Status: SHIPPED | OUTPATIENT
Start: 2020-09-17 | End: 2020-12-08

## 2020-09-17 NOTE — PROGRESS NOTES
Virtual Telephone Check-In    Rosalio Denver verbally consents to a Virtual/Telephone Check-In visit on 09/17/20. Patient has been referred to the [facility] website at [email or web address] to review the yearly Consent to Treat document.     Patient Willie Hudson condition. Also, inform the doctor with any new symptoms or medications' side effects. May return to work on 09/21/20. FOLLOW-UP: Schedule a follow-up visit in  prn.         Shyanne Huerta MD

## 2020-09-17 NOTE — TELEPHONE ENCOUNTER
Per pharmacy PA is needed for the following medication. •  PROAIR  (90 Base) MCG/ACT Inhalation Aero Soln, Inhale 2 puffs into the lungs every 6 (six) hours as needed.  Make appt, Disp: 1 Inhaler, Rfl: 0    Key: [id number]  Patient last name: Kristie

## 2020-09-17 NOTE — TELEPHONE ENCOUNTER
On call:    Called patient  With presence of my roomer present who speaks Grenadian. Patient feels tired and throat gets dry since she had covid 7/31. Feels some chest pressure on and off  Some shortness of breath when she does home chores.   Feels symp

## 2020-09-18 NOTE — TELEPHONE ENCOUNTER
Prior authorization for [employer] completed w/ [employer] on cover my meds Key: [id number], turn around time 1-5 days.

## 2020-09-21 ENCOUNTER — TELEPHONE (OUTPATIENT)
Dept: FAMILY MEDICINE CLINIC | Facility: CLINIC | Age: 60
End: 2020-09-21

## 2020-09-21 NOTE — TELEPHONE ENCOUNTER
Patient is requesting a note from  to give to her Job to restrict her of some of her duties or have time off. . try to ask the patient for what but she want to speak to a nurse or .       Please advise

## 2020-11-11 ENCOUNTER — OFFICE VISIT (OUTPATIENT)
Dept: PODIATRY CLINIC | Facility: CLINIC | Age: 60
End: 2020-11-11
Payer: COMMERCIAL

## 2020-11-11 ENCOUNTER — TELEPHONE (OUTPATIENT)
Dept: PODIATRY CLINIC | Facility: CLINIC | Age: 60
End: 2020-11-11

## 2020-11-11 DIAGNOSIS — M79.671 RIGHT FOOT PAIN: Primary | ICD-10-CM

## 2020-11-11 DIAGNOSIS — M76.62 ACHILLES TENDINITIS OF BOTH LOWER EXTREMITIES: ICD-10-CM

## 2020-11-11 DIAGNOSIS — M76.61 ACHILLES TENDINITIS OF BOTH LOWER EXTREMITIES: ICD-10-CM

## 2020-11-11 DIAGNOSIS — M77.32 CALCANEAL SPUR OF BOTH FEET: ICD-10-CM

## 2020-11-11 DIAGNOSIS — M77.31 CALCANEAL SPUR OF BOTH FEET: ICD-10-CM

## 2020-11-11 PROCEDURE — 99213 OFFICE O/P EST LOW 20 MIN: CPT | Performed by: PODIATRIST

## 2020-11-11 PROCEDURE — 99072 ADDL SUPL MATRL&STAF TM PHE: CPT | Performed by: PODIATRIST

## 2020-11-11 NOTE — PROGRESS NOTES
Wild Stark is a 62year old female. Patient presents with:  Heel Pain: Present for ongoing bilateral heel pain. Taking Tylenol for pain, helps sometimes with the pain. Pain scale 5/10, while sitting. 8/10 pain with walking. PT is not working for her. transfusion 2007   • Measles    • Osteoarthritis    • Visual impairment     Glasses      Past Surgical History:   Procedure Laterality Date   • BACK SURGERY     • BENIGN BIOPSY RIGHT     • BREAST BIOPSY     •       x2   • CARPAL TUNNEL RELEASE temperature in the posterior aspect of the heel with swelling and pain on palpation of not only the Achilles tendon but also its insertion point. Also pain on side-to-side compression consistent with heel bursitis.    2. Vascular: Patient has palpable puls the patient's convenience. The patient indicates understanding of these issues and agrees to the plan.     Deep Cruz DPM

## 2020-11-11 NOTE — TELEPHONE ENCOUNTER
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

## 2020-11-18 ENCOUNTER — TELEPHONE (OUTPATIENT)
Dept: PODIATRY CLINIC | Facility: CLINIC | Age: 60
End: 2020-11-18

## 2020-11-18 NOTE — TELEPHONE ENCOUNTER
Spoke w/ pt in regards to Claudette Birch 26 completion. Pt will go ADO  and sign Hipaa at time of her upcoming appt.

## 2020-11-18 NOTE — TELEPHONE ENCOUNTER
Pt asked if possible to call with surgery info before 1pm, as she works in the afternoon.  Pt is Burundian speaking

## 2020-11-19 NOTE — TELEPHONE ENCOUNTER
Spoke with patient this date and surgery has been scheduled for 12/16/20 at [facility] for Resection of retrocalcaneal spur/haglunds deformity with partial detachment and reattachment of the achilles tendon using the arthrex speed bridge system, right foot.   Rev

## 2020-11-19 NOTE — TELEPHONE ENCOUNTER
[employer] called back after receiving order for patient's knee scooter to let us know that [facility] no longer covers knee scooters and they do not rent them but sell them for $295.00.   She recommended we have the patient try ordering one

## 2020-12-08 ENCOUNTER — OFFICE VISIT (OUTPATIENT)
Dept: FAMILY MEDICINE CLINIC | Facility: CLINIC | Age: 60
End: 2020-12-08
Payer: COMMERCIAL

## 2020-12-08 VITALS
SYSTOLIC BLOOD PRESSURE: 153 MMHG | HEIGHT: 55 IN | HEART RATE: 67 BPM | DIASTOLIC BLOOD PRESSURE: 78 MMHG | WEIGHT: 162 LBS | BODY MASS INDEX: 37.49 KG/M2

## 2020-12-08 DIAGNOSIS — Z12.11 COLON CANCER SCREENING: ICD-10-CM

## 2020-12-08 DIAGNOSIS — Z00.00 PHYSICAL EXAM: Primary | ICD-10-CM

## 2020-12-08 DIAGNOSIS — Z12.31 VISIT FOR SCREENING MAMMOGRAM: ICD-10-CM

## 2020-12-08 DIAGNOSIS — I10 ESSENTIAL HYPERTENSION: ICD-10-CM

## 2020-12-08 PROCEDURE — 3008F BODY MASS INDEX DOCD: CPT | Performed by: FAMILY MEDICINE

## 2020-12-08 PROCEDURE — 3078F DIAST BP <80 MM HG: CPT | Performed by: FAMILY MEDICINE

## 2020-12-08 PROCEDURE — 3077F SYST BP >= 140 MM HG: CPT | Performed by: FAMILY MEDICINE

## 2020-12-08 PROCEDURE — 99396 PREV VISIT EST AGE 40-64: CPT | Performed by: FAMILY MEDICINE

## 2020-12-08 RX ORDER — LISINOPRIL 2.5 MG/1
2.5 TABLET ORAL DAILY
Qty: 30 TABLET | Refills: 3 | Status: SHIPPED | OUTPATIENT
Start: 2020-12-08 | End: 2021-04-09

## 2020-12-08 NOTE — PROGRESS NOTES
12/8/2020  11:26 AM    Deepika Watt is a 62year old female. Chief complaint(s): Patient presents with:  Routine Physical  high BP  HPI:     Deepika [place] primary complaint is regarding as above.      Xavier Brooks a 62year old female present tod Cancer Other 48        breast cancer   • Cancer Other    • Asthma Father    • Diabetes Mother         type 2   • Hypertension Mother    • Breast Cancer Sister 61   • Cancer Sister    • Hypertension Sister       Social History: Social History    Tobacco Use change, fatigue and fever. HENT: Negative for ear pain, hearing loss and nosebleeds. Eyes: Negative for visual disturbance. Respiratory: Negative for apnea, shortness of breath and wheezing.     Cardiovascular: Negative for chest pain, palpitations a left side. No focal neurological dificits. Normal gait and coordination. Skin:   Skin dermal without rashes. Psychiatric: She has a normal mood and affect. LABORATORY RESULTS:     EKG / Spirometry : -     Radiology: No results found.      ASSESS family. Use of condoms may prevent transmission of infections as well as pregnancy. Contraception option chosen by patient was NA.  REFUSALS:  Although recommended, the patient refuses the following: none .       FOLLOW-UP: Schedule a follow-up visit in 1

## 2020-12-09 ENCOUNTER — TELEPHONE (OUTPATIENT)
Dept: PODIATRY CLINIC | Facility: CLINIC | Age: 60
End: 2020-12-09

## 2020-12-09 NOTE — TELEPHONE ENCOUNTER
Dr. You Nguyen,     Please sign off on form: Fmla 1-12 wks  -Highlight the patient and hit \"Chart\" button.   -In Chart Review, w/in the Encounter tab - click 1 time on the Telephone call encounter for 11/18/2020 Scroll down the telephone encounter.  -Click

## 2020-12-09 NOTE — TELEPHONE ENCOUNTER
Pt called asking when her covid test would be done before her surgery next week. I explained that central scheduling would be calling her to schedule that but she didn't seem to understand. Pt would like to speak with someone who speaks Cape Verdean to clarify this.

## 2020-12-13 ENCOUNTER — LAB REQUISITION (OUTPATIENT)
Dept: LAB | Facility: HOSPITAL | Age: 60
End: 2020-12-13
Payer: COMMERCIAL

## 2020-12-13 DIAGNOSIS — Z01.818 ENCOUNTER FOR OTHER PREPROCEDURAL EXAMINATION: ICD-10-CM

## 2020-12-14 NOTE — TELEPHONE ENCOUNTER
Patient called for status. Completed form faxed to Attn: Alma Stewart at [phone]. Fax number given by patient.

## 2020-12-14 NOTE — TELEPHONE ENCOUNTER
Dr. Horner,     Please sign off on form: Fmla 1-12 wks  -Highlight the patient and hit \"Chart\" button.   -In Chart Review, w/in the Encounter tab - click 1 time on the Telephone call encounter for 11/18/2020 Scroll down the telephone encounter.  -Click \

## 2020-12-15 NOTE — TELEPHONE ENCOUNTER
Faxed forms out 3 more times today to same number below, all failed. Spoke to ANANDA" at pt job at [phone] and informed her of this. She gave me email [email or web address] to send forms securely via email.

## 2020-12-15 NOTE — TELEPHONE ENCOUNTER
Called patient because fax would not go through after several atempts. She will call back to confirm number.

## 2020-12-17 ENCOUNTER — TELEPHONE (OUTPATIENT)
Dept: ORTHOPEDICS CLINIC | Facility: CLINIC | Age: 60
End: 2020-12-17

## 2020-12-17 NOTE — TELEPHONE ENCOUNTER
Pt called again today to have forms refaxed and remeailed. Fax still keeps failing, email was sent to [email or web address] and a copy of forms mailed to pt home address.  INformed pt, the next best option is to distribute her forms on her own since w

## 2020-12-17 NOTE — TELEPHONE ENCOUNTER
Procedure date:  12/16/2020  How are you feeling? Feeling fine  Any bleeding? No  Is the dressing dry & intact? Yes  Level of pain? 6/10  Character of pain: aching  Onset of pain:  Aggravating factors:  Duration of pain:  Alleviating factors:  Are you taking the prescribed medication? Yes  Are you following all of the PO instructions? Yes    Other Comments:  Post op call placed to patient. She is having some pain this morning, has not yet taken pain medication. She will eat something and take as prescribed. Elevating foot. Occasional numbness in toes that gets better when she puts her foot down. Denies swelling or discoloration of toes. She will keep the clinic apprised of her progress. Follow-up appt  date: 12/23/2020    Pt was advised if they have any concerns after hours to call our office and they would be directed to on call physician.        Dr. Dave Mohr, Vandana Crisostomo

## 2020-12-18 ENCOUNTER — TELEPHONE (OUTPATIENT)
Dept: PODIATRY CLINIC | Facility: CLINIC | Age: 60
End: 2020-12-18

## 2020-12-18 RX ORDER — HYDROCODONE BITARTRATE AND ACETAMINOPHEN 5; 325 MG/1; MG/1
TABLET ORAL
COMMUNITY
Start: 2020-12-16 | End: 2020-12-29

## 2020-12-18 RX ORDER — AMOXICILLIN AND CLAVULANATE POTASSIUM 875; 125 MG/1; MG/1
1 TABLET, FILM COATED ORAL 2 TIMES DAILY
COMMUNITY
Start: 2020-12-16 | End: 2021-01-05 | Stop reason: ALTCHOICE

## 2020-12-18 NOTE — TELEPHONE ENCOUNTER
Pt had sx on 12/16. S/w pt and she states right foot has increased pain when she elevates her foot on a pillow. Pt states she took 1000mg of tylenol in between her norco doses on 12/17 and she is taking 2 tabs of norco every 6 hrs.  I advised her she should

## 2020-12-18 NOTE — TELEPHONE ENCOUNTER
Pt notified per Dr. Mitra Taylor orders. She verbalized understanding. She will CB if she any concerns.

## 2020-12-18 NOTE — TELEPHONE ENCOUNTER
Tell the patient she can take the Norco every 4 hours and if she needs more she can take Motrin as well 400 mg at 3 times a day with food thank you

## 2020-12-18 NOTE — TELEPHONE ENCOUNTER
Patient is having pain when she elevates her foot. She is using a scooter if she needs to move around. She has been taking pain meds given to her after her surgery. Please call her regarding her pain.

## 2020-12-23 ENCOUNTER — OFFICE VISIT (OUTPATIENT)
Dept: PODIATRY CLINIC | Facility: CLINIC | Age: 60
End: 2020-12-23
Payer: COMMERCIAL

## 2020-12-23 DIAGNOSIS — M77.32 CALCANEAL SPUR OF BOTH FEET: ICD-10-CM

## 2020-12-23 DIAGNOSIS — M77.31 CALCANEAL SPUR OF BOTH FEET: ICD-10-CM

## 2020-12-23 DIAGNOSIS — M79.671 RIGHT FOOT PAIN: Primary | ICD-10-CM

## 2020-12-23 PROCEDURE — 99024 POSTOP FOLLOW-UP VISIT: CPT | Performed by: PODIATRIST

## 2020-12-23 NOTE — PROGRESS NOTES
Wild Stark is a 62year old female. Patient presents with:  Post-Op: Present for s/p sx 12/16/20. Patient is having pain, pain scale 7/10. Tries to elevate feet at home. Takes pain medication which helps.          HPI:   Patient returns to clinic 1 week SURGERY     • BENIGN BIOPSY RIGHT     • BREAST BIOPSY     •       x2   • CARPAL TUNNEL RELEASE     • CATARACT     • CORRECT BUNION,OTHR METHODS     • EXCISION LESION HEAD/NECK/FACE Left 10/11/2017    Performed by Nahed Alexander MD at [address] [place] [address] visit:    Right foot pain    Calcaneal spur of both feet        Plan: Patient will continue with rest elevation and she will not take off the cast which is now only fastened with an Ace wrap.   The patient understood all instructions I told her if she falls

## 2020-12-29 ENCOUNTER — OFFICE VISIT (OUTPATIENT)
Dept: PODIATRY CLINIC | Facility: CLINIC | Age: 60
End: 2020-12-29
Payer: COMMERCIAL

## 2020-12-29 DIAGNOSIS — Z98.890 STATUS POST FOOT SURGERY: Primary | ICD-10-CM

## 2020-12-29 PROCEDURE — 99024 POSTOP FOLLOW-UP VISIT: CPT | Performed by: PODIATRIST

## 2020-12-29 RX ORDER — HYDROCODONE BITARTRATE AND ACETAMINOPHEN 5; 325 MG/1; MG/1
1 TABLET ORAL EVERY 4 HOURS PRN
Qty: 20 TABLET | Refills: 0 | Status: SHIPPED | OUTPATIENT
Start: 2020-12-29 | End: 2021-04-09

## 2020-12-29 NOTE — PROGRESS NOTES
Chava Alston is a 62year old female. Patient presents with:  Post-Op: s/p R foot f/u - had sx on 12/16/2020 - states she still has pain rated as 6-10/10 on and off         HPI:   Patient returns to clinic still experiencing pain.   Tylenol is now working History:   Procedure Laterality Date   • BACK SURGERY     • BENIGN BIOPSY RIGHT     • BREAST BIOPSY     •       x2   • CARPAL TUNNEL RELEASE     • CATARACT     • CORRECT BUNION,OTHR METHODS     • EXCISION LESION HEAD/NECK/FACE Left 10/11/2017    P or tear. ASSESSMENT AND PLAN:   Diagnoses and all orders for this visit:    Status post foot surgery        Plan:  At this particular point in time place the patient in a cam boot she will not remove it she will sleep with it on to protect the area the imp

## 2021-01-05 ENCOUNTER — OFFICE VISIT (OUTPATIENT)
Dept: PODIATRY CLINIC | Facility: CLINIC | Age: 61
End: 2021-01-05
Payer: COMMERCIAL

## 2021-01-05 DIAGNOSIS — Z98.890 STATUS POST FOOT SURGERY: Primary | ICD-10-CM

## 2021-01-05 PROCEDURE — 99024 POSTOP FOLLOW-UP VISIT: CPT | Performed by: PODIATRIST

## 2021-01-05 NOTE — PROGRESS NOTES
Kaylee Vale is a 62year old female. Patient presents with:  Post-Op: s/p right heel -- Sx on 12/16/20. Rates pain 0/10 right now and denies fever. HPI:   Patient returns to the clinic she is now 3 weeks status post surgery on her right heel.   Jose Hernandez HEAD/NECK/FACE Left 10/11/2017    Performed by Cherylene Moulder, MD at [facility] OR   • EYE SURGERY     • FOOT SURGERY Right    • REPAIR ROTATOR CUFF,ACUTE Left     1/2014   • SPINE SURGERY PROCEDURE UNLISTED     • TUBAL LIGATION Bilateral       Family Histor but sooner if required. The patient indicates understanding of these issues and agrees to the plan.     Apolonia Hodge DPM

## 2021-01-13 ENCOUNTER — OFFICE VISIT (OUTPATIENT)
Dept: PODIATRY CLINIC | Facility: CLINIC | Age: 61
End: 2021-01-13
Payer: COMMERCIAL

## 2021-01-13 DIAGNOSIS — Z98.890 STATUS POST FOOT SURGERY: Primary | ICD-10-CM

## 2021-01-13 PROCEDURE — 99024 POSTOP FOLLOW-UP VISIT: CPT | Performed by: PODIATRIST

## 2021-01-17 NOTE — PROGRESS NOTES
Jackelyn Vinson is a 62year old female. Patient presents with:  Post-Op: 4th post op visit. sx 12/16/20. Deneis any pain at this time. HPI:   Patient is here now 4 weeks status post surgery overall doing well no complaints of any severe pain.   At t MD at [address] [facility] MAIN OR   • EYE SURGERY     • FOOT SURGERY Right    • REPAIR ROTATOR CUFF,ACUTE Left     1/2014   • SPINE SURGERY PROCEDURE UNLISTED     • TUBAL LIGATION Bilateral       Family History   Problem Relation Age of Onset   • Breast Cancer Other 49 restrictions return in 2 weeks looks good at that time and what we will do is give her walking on the boot. May use some light physical therapy    The patient indicates understanding of these issues and agrees to the plan.     Ardyce Cranker, DPM

## 2021-01-26 ENCOUNTER — OFFICE VISIT (OUTPATIENT)
Dept: PODIATRY CLINIC | Facility: CLINIC | Age: 61
End: 2021-01-26
Payer: COMMERCIAL

## 2021-01-26 DIAGNOSIS — Z98.890 STATUS POST FOOT SURGERY: Primary | ICD-10-CM

## 2021-01-26 PROCEDURE — 99024 POSTOP FOLLOW-UP VISIT: CPT | Performed by: PODIATRIST

## 2021-01-26 NOTE — PROGRESS NOTES
Deepika Watt is a 62year old female. Patient presents with:  Post-Op: s/p right heel -- Sx on 12/16/21. States she sometimes has pain. Rates pain 0/10 right now. Denies any fever.          HPI:   Patient returns to clinic now 6 weeks status post surgery BRY MACEDO METHODS     • EXCISION LESION HEAD/NECK/FACE Left 10/11/2017    Performed by Raymundo Min MD at [facility] OR   • EYE SURGERY     • FOOT SURGERY Right    • REPAIR ROTATOR CUFF,ACUTE Left     1/2014   • SPINE SURGERY PROCEDURE UNLISTED     • FRANCIS in addition to that she has good plantarflexion strength I will see her in 2 weeks at which time we will get her into physical therapy.   ASSESSMENT AND PLAN:   Diagnoses and all orders for this visit:    Status post foot surgery        Plan: Follow-up in 2

## 2021-02-10 ENCOUNTER — OFFICE VISIT (OUTPATIENT)
Dept: PODIATRY CLINIC | Facility: CLINIC | Age: 61
End: 2021-02-10
Payer: COMMERCIAL

## 2021-02-10 DIAGNOSIS — Z98.890 STATUS POST FOOT SURGERY: Primary | ICD-10-CM

## 2021-02-10 PROCEDURE — 99024 POSTOP FOLLOW-UP VISIT: CPT | Performed by: PODIATRIST

## 2021-02-11 NOTE — PROGRESS NOTES
Polo Nguyen is a 62year old female. Patient presents with:  Post-Op: s/p right heel -- Sx on 12/16/21. Pt states sx site has a little bit of drainage. Denies any pain right now or fever.          HPI:   Patient is now 8 weeks status post surgery still h RELEASE     • CATARACT     • CORRECT BUNION,OTHR METHODS     • EXCISION LESION HEAD/NECK/FACE Left 10/11/2017    Performed by Craige Duverney, MD at [facility] OR   • EYE SURGERY     • FOOT SURGERY Right    • REPAIR ROTATOR CUFF,ACUTE Left     1/2014   • SPIN Diagnoses and all orders for this visit:    Status post foot surgery        Plan: Today recommended the patient continue to walk in the boot. He does not need to use a walker.   In addition to that she will not walk barefoot which she states she has been

## 2021-02-22 ENCOUNTER — TELEPHONE (OUTPATIENT)
Dept: FAMILY MEDICINE CLINIC | Facility: CLINIC | Age: 61
End: 2021-02-22

## 2021-02-22 NOTE — TELEPHONE ENCOUNTER
Patient is requesting a call to discuss ultrasound results. Ultrasound was completed at [facility] on 2/7.

## 2021-02-25 ENCOUNTER — OFFICE VISIT (OUTPATIENT)
Dept: PODIATRY CLINIC | Facility: CLINIC | Age: 61
End: 2021-02-25
Payer: COMMERCIAL

## 2021-02-25 VITALS — WEIGHT: 152 LBS | BODY MASS INDEX: 30.64 KG/M2 | HEIGHT: 59 IN

## 2021-02-25 DIAGNOSIS — M76.62 ACHILLES TENDINITIS OF BOTH LOWER EXTREMITIES: ICD-10-CM

## 2021-02-25 DIAGNOSIS — Z98.890 STATUS POST FOOT SURGERY: Primary | ICD-10-CM

## 2021-02-25 DIAGNOSIS — M76.61 ACHILLES TENDINITIS OF BOTH LOWER EXTREMITIES: ICD-10-CM

## 2021-02-25 PROCEDURE — 3008F BODY MASS INDEX DOCD: CPT | Performed by: PODIATRIST

## 2021-02-25 PROCEDURE — 99024 POSTOP FOLLOW-UP VISIT: CPT | Performed by: PODIATRIST

## 2021-02-25 NOTE — PROGRESS NOTES
Polo Nguyen is a 62year old female.  Patient presents with:  Post-Op: sx on 12/16/21,right heel ,still complains of pain shooting now and then at an 8/10         HPI:     Patient is now about 8 to 9 weeks status post surgery overall she is doing fairly • CARPAL TUNNEL RELEASE     • CATARACT     • CORRECT BUNION,OTHR METHODS     • EXCISION LESION HEAD/NECK/FACE Left 10/11/2017    Performed by Ky Marley MD at [facility] OR   • EYE SURGERY     • FOOT SURGERY Right    • REPAIR ROTATOR CUFF,ACUTE Left normal.   3. Musculoskeletal: All muscle groups are graded 5 out of 5 in the foot and ankle.    4. Neurological: Normal sharp dull sensation; reflexes normal.  She has a well-healed incision over the posterior aspect of the Achilles no bony prominences note

## 2021-03-19 ENCOUNTER — OFFICE VISIT (OUTPATIENT)
Dept: PODIATRY CLINIC | Facility: CLINIC | Age: 61
End: 2021-03-19
Payer: COMMERCIAL

## 2021-03-19 VITALS — WEIGHT: 160 LBS | BODY MASS INDEX: 32.25 KG/M2 | HEIGHT: 59 IN

## 2021-03-19 DIAGNOSIS — Z98.890 STATUS POST FOOT SURGERY: Primary | ICD-10-CM

## 2021-03-19 PROCEDURE — 99212 OFFICE O/P EST SF 10 MIN: CPT | Performed by: PODIATRIST

## 2021-03-19 PROCEDURE — 3008F BODY MASS INDEX DOCD: CPT | Performed by: PODIATRIST

## 2021-03-22 ENCOUNTER — TELEPHONE (OUTPATIENT)
Dept: PODIATRY CLINIC | Facility: CLINIC | Age: 61
End: 2021-03-22

## 2021-03-22 NOTE — PROGRESS NOTES
Flavia Yousif is a 62year old female.  Patient presents with:  Post-Op: heel spur on right foot ,patient will start PT on Monday,denies pain today ,somedays does have mild pain        HPI:   Patient returns to clinic still experiencing some discomfort but • CARPAL TUNNEL RELEASE     • CATARACT     • CORRECT BUNION,OTHR METHODS     • EXCISION LESION HEAD/NECK/FACE Left 10/11/2017    Performed by Faby Carbajal MD at [facility] OR   • EYE SURGERY     • FOOT SURGERY Right    • REPAIR ROTATOR CUFF,ACUTE Left Partner Violence:       Fear of Current or Ex-Partner:       Emotionally Abused:       Physically Abused:       Sexually Abused:         REVIEW OF SYSTEMS:   Today reviewed systens as documented below  GENERAL HEALTH: feels well otherwise  SKIN: Refer to e

## 2021-03-24 NOTE — TELEPHONE ENCOUNTER
Disab forms from reliance received in forms dept. Logged for processing. No valid hipaa on file. Will need to call pt to get hipaa.

## 2021-03-29 NOTE — TELEPHONE ENCOUNTER
Spoke to patient and she has valid HIPAA but number 4 is not filled out correctly. We need #4 to say [place]. If allowed she did give a verbal ok to send information to [place] due to her living so far.

## 2021-03-30 ENCOUNTER — TELEPHONE (OUTPATIENT)
Dept: FAMILY MEDICINE CLINIC | Facility: CLINIC | Age: 61
End: 2021-03-30

## 2021-03-30 ENCOUNTER — TELEPHONE (OUTPATIENT)
Dept: PODIATRY CLINIC | Facility: CLINIC | Age: 61
End: 2021-03-30

## 2021-03-30 NOTE — TELEPHONE ENCOUNTER
Pt was seen in [place] [address] ER on Friday 3-26-21 and was told to follow up with her pcp. Pt would like to see Dr. Bishop Sands on Thursday 4-1-21. There are no available appointments.

## 2021-03-30 NOTE — TELEPHONE ENCOUNTER
[facility] PT needs clarification of PT orders. PT ordered on 02/25/21 by Lito Aldridge. Call [phone] to discuss PT orders. Dr Lito Aldridge please advise:    [facility] PT and spoke to Meghann Gomez.    Per Meghann Gomez, they need to know the followin

## 2021-03-30 NOTE — TELEPHONE ENCOUNTER
Scheduled the appt to see Dr. Paresh Bradshaw on 4-9-21 at 8:15. Pt was offered other dates and times and this is the date pt scheduled for.

## 2021-04-02 NOTE — TELEPHONE ENCOUNTER
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   The patient can bear weight in an athletic shoe she should have the protocol for this wh

## 2021-04-02 NOTE — TELEPHONE ENCOUNTER
[facility] and spoke to Anjum Vasquez and informed her of Evens's message and instructions. Elva verbalized understanding.

## 2021-04-07 ENCOUNTER — TELEPHONE (OUTPATIENT)
Dept: PODIATRY CLINIC | Facility: CLINIC | Age: 61
End: 2021-04-07

## 2021-04-07 NOTE — TELEPHONE ENCOUNTER
Spoke to pt and informed her of updated note with sitting restrictions. Pt will call back with fax #.

## 2021-04-07 NOTE — TELEPHONE ENCOUNTER
Patient returning rn call, please make the fax to the Attn: Cecille Chicas or Candice Jordan.     Fax: [phone]

## 2021-04-07 NOTE — TELEPHONE ENCOUNTER
Patient called and said she started working this week and has pain in her foot and her foot is also swollen. She said the 4 hrs she is at work she spends them standing.  She would like to know if restrictions can be changed  To sit or what else can be done

## 2021-04-08 NOTE — TELEPHONE ENCOUNTER
Letter faxed as indicated below. Fax confirmation received. Call conducted with [facility] [place]  [address] [place] [facility]. Notified her letter has been faxed. Patient requested copy of letter mailed to her as well. Letter mailed.  Notified her that

## 2021-04-09 ENCOUNTER — TELEPHONE (OUTPATIENT)
Dept: PODIATRY CLINIC | Facility: CLINIC | Age: 61
End: 2021-04-09

## 2021-04-09 ENCOUNTER — LAB ENCOUNTER (OUTPATIENT)
Dept: LAB | Age: 61
End: 2021-04-09
Attending: FAMILY MEDICINE
Payer: COMMERCIAL

## 2021-04-09 ENCOUNTER — OFFICE VISIT (OUTPATIENT)
Dept: FAMILY MEDICINE CLINIC | Facility: CLINIC | Age: 61
End: 2021-04-09
Payer: COMMERCIAL

## 2021-04-09 VITALS
HEIGHT: 59 IN | DIASTOLIC BLOOD PRESSURE: 76 MMHG | HEART RATE: 76 BPM | SYSTOLIC BLOOD PRESSURE: 136 MMHG | WEIGHT: 161.81 LBS | BODY MASS INDEX: 32.62 KG/M2

## 2021-04-09 DIAGNOSIS — A08.4 VIRAL GASTROENTERITIS: Primary | ICD-10-CM

## 2021-04-09 DIAGNOSIS — I10 ESSENTIAL HYPERTENSION: ICD-10-CM

## 2021-04-09 DIAGNOSIS — Z00.00 PHYSICAL EXAM: ICD-10-CM

## 2021-04-09 PROCEDURE — 3078F DIAST BP <80 MM HG: CPT | Performed by: FAMILY MEDICINE

## 2021-04-09 PROCEDURE — 83036 HEMOGLOBIN GLYCOSYLATED A1C: CPT

## 2021-04-09 PROCEDURE — 3008F BODY MASS INDEX DOCD: CPT | Performed by: FAMILY MEDICINE

## 2021-04-09 PROCEDURE — 3075F SYST BP GE 130 - 139MM HG: CPT | Performed by: FAMILY MEDICINE

## 2021-04-09 PROCEDURE — 84443 ASSAY THYROID STIM HORMONE: CPT

## 2021-04-09 PROCEDURE — 99213 OFFICE O/P EST LOW 20 MIN: CPT | Performed by: FAMILY MEDICINE

## 2021-04-09 PROCEDURE — 81015 MICROSCOPIC EXAM OF URINE: CPT | Performed by: FAMILY MEDICINE

## 2021-04-09 PROCEDURE — 82306 VITAMIN D 25 HYDROXY: CPT | Performed by: FAMILY MEDICINE

## 2021-04-09 PROCEDURE — 80053 COMPREHEN METABOLIC PANEL: CPT

## 2021-04-09 PROCEDURE — 80061 LIPID PANEL: CPT

## 2021-04-09 PROCEDURE — 85025 COMPLETE CBC W/AUTO DIFF WBC: CPT

## 2021-04-09 PROCEDURE — 81001 URINALYSIS AUTO W/SCOPE: CPT | Performed by: FAMILY MEDICINE

## 2021-04-09 PROCEDURE — 36415 COLL VENOUS BLD VENIPUNCTURE: CPT

## 2021-04-09 RX ORDER — ERGOCALCIFEROL 1.25 MG/1
CAPSULE ORAL
Qty: 12 CAPSULE | Refills: 1 | Status: SHIPPED | OUTPATIENT
Start: 2021-04-09 | End: 2021-04-19

## 2021-04-09 RX ORDER — LISINOPRIL 2.5 MG/1
2.5 TABLET ORAL DAILY
Qty: 30 TABLET | Refills: 3 | Status: SHIPPED | OUTPATIENT
Start: 2021-04-09 | End: 2021-06-17

## 2021-04-09 RX ORDER — DICYCLOMINE HCL 20 MG
20 TABLET ORAL 3 TIMES DAILY
COMMUNITY
Start: 2021-03-27

## 2021-04-09 RX ORDER — ONDANSETRON 4 MG/1
4 TABLET, ORALLY DISINTEGRATING ORAL EVERY 6 HOURS
COMMUNITY
Start: 2021-03-27 | End: 2021-04-09 | Stop reason: ALTCHOICE

## 2021-04-09 NOTE — PROGRESS NOTES
4/9/2021  8:10 AM    Brian Collier is a 62year old female.     Chief complaint(s): Patient presents with:  ER F/U: was in ER for diarrhea and vomiting   Test Results: ultrasound from Feb.     HPI:     Brian Collier primary complaint is regarding multple • Breast Cancer Other 53        breast cancer   • Cancer Other    • Asthma Father    • Diabetes Mother         type 2   • Hypertension Mother    • Breast Cancer Sister 61   • Cancer Sister    • Hypertension Sister       Social History: Social History resuscitated  Benzoin                 RASH  Iodine (Topical)        RASH  Hydrocodone             RASH    Comment:Not sure if allergic      ROS:   Review of Systems   Constitutional: Negative for appetite change and fever.    Eyes: Negative for visual distu Oral Cap 12 capsule 1     Sig: TK 1 C PO ONCE WEEKLY   • lisinopril 2.5 MG Oral Tab 30 tablet 3     Sig: Take 1 tablet (2.5 mg total) by mouth daily. LABORATORY & ORDERS: No orders of the defined types were placed in this encounter.     RECOMMENDATIONS

## 2021-04-13 NOTE — TELEPHONE ENCOUNTER
Dr. Richi Gamble,     Please sign off on form: Disability   -Highlight the patient and hit \"Chart\" button.   -In Chart Review, w/in the Encounter tab - click 1 time on the Telephone call encounter for 11/18/2020 Scroll down the telephone encounter.  -Click \"s

## 2021-04-14 NOTE — TELEPHONE ENCOUNTER
Disab completed and faxed to Pascual Joseph per requested from pt [phone]. Pt is aware.  Mailed copy to home address

## 2021-04-19 ENCOUNTER — OFFICE VISIT (OUTPATIENT)
Dept: FAMILY MEDICINE CLINIC | Facility: CLINIC | Age: 61
End: 2021-04-19
Payer: COMMERCIAL

## 2021-04-19 VITALS
HEIGHT: 59 IN | HEART RATE: 77 BPM | BODY MASS INDEX: 32.66 KG/M2 | SYSTOLIC BLOOD PRESSURE: 137 MMHG | DIASTOLIC BLOOD PRESSURE: 78 MMHG | WEIGHT: 162 LBS

## 2021-04-19 DIAGNOSIS — R73.03 PREDIABETES: ICD-10-CM

## 2021-04-19 DIAGNOSIS — I10 ESSENTIAL HYPERTENSION: Primary | ICD-10-CM

## 2021-04-19 DIAGNOSIS — E78.00 PURE HYPERCHOLESTEROLEMIA: ICD-10-CM

## 2021-04-19 PROCEDURE — 3078F DIAST BP <80 MM HG: CPT | Performed by: FAMILY MEDICINE

## 2021-04-19 PROCEDURE — 3008F BODY MASS INDEX DOCD: CPT | Performed by: FAMILY MEDICINE

## 2021-04-19 PROCEDURE — 3075F SYST BP GE 130 - 139MM HG: CPT | Performed by: FAMILY MEDICINE

## 2021-04-19 PROCEDURE — 99213 OFFICE O/P EST LOW 20 MIN: CPT | Performed by: FAMILY MEDICINE

## 2021-04-19 RX ORDER — ERGOCALCIFEROL 1.25 MG/1
CAPSULE ORAL
Qty: 12 CAPSULE | Refills: 3 | Status: SHIPPED | OUTPATIENT
Start: 2021-04-19

## 2021-04-19 RX ORDER — ATORVASTATIN CALCIUM 10 MG/1
10 TABLET, FILM COATED ORAL NIGHTLY
Qty: 90 TABLET | Refills: 3 | Status: SHIPPED | OUTPATIENT
Start: 2021-04-19

## 2021-04-19 NOTE — PROGRESS NOTES
4/19/2021  1:40 PM    Chava Alston is a 62year old female. Chief complaint(s): Patient presents with:  Test Results: from 04/09 on lipids, and A1C  HTN and high cholesterol  HPI:     Chava Alston primary complaint is regarding as above.        Fidencio Younger • FOOT SURGERY Right    • REPAIR ROTATOR CUFF,ACUTE Left     1/2014   • SPINE SURGERY PROCEDURE UNLISTED     • TUBAL LIGATION Bilateral       Family History   Problem Relation Age of Onset   • Breast Cancer Other 48        breast cancer   • Cancer Other NATURAL PRODUCT OP Take by mouth. • Ibuprofen (ADVIL OR) Take by mouth as needed. (Patient not taking: Reported on 4/19/2021 )     • acetaminophen 325 MG Oral Tab Take 325 mg by mouth every 6 (six) hours as needed for Pain.  (Patient not taking: Repor 3 0 - 18 mmol/L    BUN 16 7 - 18 mg/dL    Creatinine 0.77 0.55 - 1.02 mg/dL    BUN/CREA Ratio 20.8 (H) 10.0 - 20.0    Calcium, Total 9.6 8.5 - 10.1 mg/dL    Calculated Osmolality 289 275 - 295 mOsm/kg    GFR, Non- 84 >=60    GFR, -Am fL    RDW 14.7 11.0 - 15.0 %    .0 150.0 - 450.0 10(3)uL    Neutrophil Absolute Prelim 3.38 1.50 - 7.70 x10 (3) uL    Neutrophil Absolute 3.38 1.50 - 7.70 x10(3) uL    Lymphocyte Absolute 1.79 1.00 - 4.00 x10(3) uL    Monocyte Absolute 0.38 0.10 - 1 informed to call our office with any questions or concerns. Notify Dr Clari Decker or the [facility] if there is a deterioration or worsening of the medical condition. Also, inform the doctor with any new symptoms or medications' side effects.     REFUSALS

## 2021-05-03 ENCOUNTER — OFFICE VISIT (OUTPATIENT)
Dept: PODIATRY CLINIC | Facility: CLINIC | Age: 61
End: 2021-05-03
Payer: COMMERCIAL

## 2021-05-03 DIAGNOSIS — Z98.890 STATUS POST FOOT SURGERY: Primary | ICD-10-CM

## 2021-05-03 DIAGNOSIS — M79.671 RIGHT FOOT PAIN: ICD-10-CM

## 2021-05-03 PROCEDURE — 99213 OFFICE O/P EST LOW 20 MIN: CPT | Performed by: PODIATRIST

## 2021-05-03 RX ORDER — METHYLPREDNISOLONE 4 MG/1
TABLET ORAL
Qty: 1 PACKAGE | Refills: 0 | Status: SHIPPED | OUTPATIENT
Start: 2021-05-03 | End: 2021-07-09 | Stop reason: ALTCHOICE

## 2021-05-03 NOTE — PROGRESS NOTES
Nils Pastor is a 62year old female. Patient presents with: Follow - Up: on and off pain on right foot - gets very swollen after a long day - pain scale 6/10 - takes tyelnol as needed.         HPI:   Patient returns to the clinic she is now about 4 to 5 BENIGN BIOPSY RIGHT     • BREAST BIOPSY     •       x2   • CARPAL TUNNEL RELEASE     • CATARACT     • CORRECT BUNION,OTHR METHODS     • EYE SURGERY     • FOOT SURGERY Right    • REPAIR ROTATOR CUFF,ACUTE Left     2014   • SPINE SURGERY PROCEDURE De Leon's test is negative for rupture. Give the patient 3 weeks off we will try her on a Medrol Dosepak and see how she does and then I will follow-up with her again in a couple of weeks.     The patient indicates understanding of these issues and agrees

## 2021-05-25 ENCOUNTER — OFFICE VISIT (OUTPATIENT)
Dept: PODIATRY CLINIC | Facility: CLINIC | Age: 61
End: 2021-05-25
Payer: COMMERCIAL

## 2021-05-25 DIAGNOSIS — S86.011A PARTIAL TEAR OF RIGHT ACHILLES TENDON, INITIAL ENCOUNTER: ICD-10-CM

## 2021-05-25 DIAGNOSIS — M67.88 ACHILLES TENDINOSIS OF RIGHT LOWER EXTREMITY: Primary | ICD-10-CM

## 2021-05-25 PROCEDURE — 99213 OFFICE O/P EST LOW 20 MIN: CPT | Performed by: PODIATRIST

## 2021-05-25 NOTE — PROGRESS NOTES
Felipe Husain is a 27[address] [facility]. Patient presents with: Follow - Up: right foot still hurts - pain scale 8/10 - takes tylenol as needed.         HPI:   *** At today's visit reviewed nurse's history as taken above, allergies medications and medical h ROTATOR CUFF,ACUTE Left     1/2014   • SPINE SURGERY PROCEDURE UNLISTED     • TUBAL LIGATION Bilateral       Family History   Problem Relation Age of Onset   • Breast Cancer Other 48        breast cancer   • Cancer Other    • Asthma Father    • Diabetes Mo

## 2021-05-25 NOTE — PROGRESS NOTES
Nicholas Wolfe is a 62year old female. Patient presents with: Follow - Up: right foot still hurts - pain scale 8/10 - takes tylenol as needed.         HPI:   Returns to the clinic she is complaining of pain but not at the surgical site she is developing a BACK SURGERY     • BENIGN BIOPSY RIGHT     • BREAST BIOPSY     •       x2   • CARPAL TUNNEL RELEASE     • CATARACT     • CORRECT BUNION,OTHR METHODS     • EYE SURGERY     • FOOT SURGERY Right    • REPAIR ROTATOR CUFF,ACUTE Left     2014   • SPIN tendon suggesting a partial tear that is fibrosing and tendinosis is developing    ASSESSMENT AND PLAN:   Diagnoses and all orders for this visit:    Achilles tendinosis of right lower extremity  -     US ANKLE BILATERAL COMPLETE (ZSE=[id number]);  Future    Par

## 2021-05-26 NOTE — TELEPHONE ENCOUNTER
Pt called stating that [employer] is requesting progress notes from Dr [employer] office visits since 3/2021.  Obtained verbal consent to release PHI to [place], pt states she does not have access to [employer] or email and she lives far away a

## 2021-06-01 NOTE — H&P
[facility] - Gastroenterology                                                                                                               Reason for consult: crc screening    Requesting physician or provider: MARJAN did not have it   • Colon polyps     dx'd in 4/22012   • CTS (carpal tunnel syndrome)    • Diverticulosis    • Esophageal reflux    • Facial mass     benign   • History of blood transfusion 2007   • Measles    • Osteoarthritis    • Visual impairment     Gl mouth daily. 30 tablet 3   • Ibuprofen (ADVIL OR) Take by mouth as needed. • PROAIR  (90 Base) MCG/ACT Inhalation Aero Soln Inhale 2 puffs into the lungs every 6 (six) hours as needed.  Make appt 1 Inhaler 0   • acetaminophen 325 MG Oral Tab Ta 4. 59 3.80 - 5.30 x10(6)uL    HGB 13.5 12.0 - 16.0 g/dL    HCT 40.9 35.0 - 48.0 %    MCV 89.1 80.0 - 100.0 fL    MCH 29.4 26.0 - 34.0 pg    MCHC 33.0 31.0 - 37.0 g/dL    RDW-SD 47.3 (H) 35.1 - 46.3 fL    RDW 14.7 11.0 - 15.0 %    .0 150.0 - 450.0 10( of results can be found in Results Review.          ASSESSMENT/PLAN:   Wild Stark is a 62year old year-old female with history of anxiety, bronchitis, gerd, oa:    #constipation  #diarrhea  #hematochezia  #h/o cln polyps  She describes bowel pattern of Visit:  No orders of the defined types were placed in this encounter.       Meds This Visit:  Requested Prescriptions     Signed Prescriptions Disp Refills   • Pantoprazole Sodium 40 MG Oral Tab EC 30 tablet 3     Sig: Take 1 tablet (40 mg total) by mouth e

## 2021-06-02 ENCOUNTER — TELEPHONE (OUTPATIENT)
Dept: GASTROENTEROLOGY | Facility: CLINIC | Age: 61
End: 2021-06-02

## 2021-06-02 ENCOUNTER — OFFICE VISIT (OUTPATIENT)
Dept: GASTROENTEROLOGY | Facility: CLINIC | Age: 61
End: 2021-06-02
Payer: COMMERCIAL

## 2021-06-02 VITALS
BODY MASS INDEX: 33.87 KG/M2 | WEIGHT: 168 LBS | SYSTOLIC BLOOD PRESSURE: 128 MMHG | HEIGHT: 59 IN | DIASTOLIC BLOOD PRESSURE: 84 MMHG | HEART RATE: 77 BPM

## 2021-06-02 DIAGNOSIS — K92.1 BLOOD IN STOOL: ICD-10-CM

## 2021-06-02 DIAGNOSIS — Z12.11 COLON CANCER SCREENING: Primary | ICD-10-CM

## 2021-06-02 DIAGNOSIS — R19.7 DIARRHEA, UNSPECIFIED TYPE: ICD-10-CM

## 2021-06-02 DIAGNOSIS — K59.00 CONSTIPATION, UNSPECIFIED CONSTIPATION TYPE: ICD-10-CM

## 2021-06-02 DIAGNOSIS — K21.9 GASTROESOPHAGEAL REFLUX DISEASE, UNSPECIFIED WHETHER ESOPHAGITIS PRESENT: ICD-10-CM

## 2021-06-02 DIAGNOSIS — K92.1 HEMATOCHEZIA: ICD-10-CM

## 2021-06-02 PROCEDURE — 99244 OFF/OP CNSLTJ NEW/EST MOD 40: CPT | Performed by: NURSE PRACTITIONER

## 2021-06-02 PROCEDURE — 3074F SYST BP LT 130 MM HG: CPT | Performed by: NURSE PRACTITIONER

## 2021-06-02 PROCEDURE — 3008F BODY MASS INDEX DOCD: CPT | Performed by: NURSE PRACTITIONER

## 2021-06-02 PROCEDURE — 3079F DIAST BP 80-89 MM HG: CPT | Performed by: NURSE PRACTITIONER

## 2021-06-02 RX ORDER — PANTOPRAZOLE SODIUM 40 MG/1
40 TABLET, DELAYED RELEASE ORAL
Qty: 30 TABLET | Refills: 3 | Status: SHIPPED | OUTPATIENT
Start: 2021-06-02 | End: 2021-11-08

## 2021-06-02 NOTE — PATIENT INSTRUCTIONS
-benefiber once daily  -start pantoprazole 40 mg/daily 30 min prior to first meal of the day  -reflux diet modifcation    1. Schedule colonoscopy/egd with MAC w/ Dr. Antonina Salazar: h/o cln polyps, hematochezia, constipation, diarrhea, gerd ]    2.   bow bedtime  · Don't wear tight-fitting clothes  · Limit aspirin and ibuprofen  · Stop smoking     Kamar last reviewed this educational content on 6/1/2019  © 5187-7871 [employer]. [address] [facility], [address] [place] [address].  All rights reserve

## 2021-06-02 NOTE — TELEPHONE ENCOUNTER
Scheduled for:  Colonoscopy [id number]; [phone]  Provider Name:  Dr Wood Nguyen  Date:  10/01/2021  Location:  [facility]  Sedation:  MAC  Time:  12:30PM (pt is aware to arrive at 11:30am)  Prep:  Miralax  Meds/Allergies Reconciled?:  Jolanta/NP reviewed.   Diagnosis with

## 2021-06-17 RX ORDER — LISINOPRIL 2.5 MG/1
2.5 TABLET ORAL DAILY
Qty: 90 TABLET | Refills: 0 | Status: SHIPPED | OUTPATIENT
Start: 2021-06-17 | End: 2021-11-09

## 2021-06-17 NOTE — TELEPHONE ENCOUNTER
Current Outpatient Medications:   •  lisinopril 2.5 MG Oral Tab, Take 1 tablet (2.5 mg total) by mouth daily. , Disp: 30 tablet, Rfl: 3      QTY 90  Insurance requires 90 day supply.

## 2021-06-22 ENCOUNTER — TELEPHONE (OUTPATIENT)
Dept: PODIATRY CLINIC | Facility: CLINIC | Age: 61
End: 2021-06-22

## 2021-06-22 NOTE — TELEPHONE ENCOUNTER
Per Dr Myles Boogie verbal request patient was called and was told that the US of the R foot and ankle is normal, she stated she still has pain so she was advised to make a f/u ngerito with Dr Myles Boogie at her convenience to discuss the next step of her care. Pt verbalized understanding and she states she will call to make an negrito.  Shirley Camacho

## 2021-07-09 ENCOUNTER — OFFICE VISIT (OUTPATIENT)
Dept: PODIATRY CLINIC | Facility: CLINIC | Age: 61
End: 2021-07-09
Payer: COMMERCIAL

## 2021-07-09 DIAGNOSIS — Z98.890 STATUS POST FOOT SURGERY: ICD-10-CM

## 2021-07-09 DIAGNOSIS — M79.671 RIGHT FOOT PAIN: ICD-10-CM

## 2021-07-09 DIAGNOSIS — S86.011A PARTIAL TEAR OF RIGHT ACHILLES TENDON, INITIAL ENCOUNTER: Primary | ICD-10-CM

## 2021-07-09 PROCEDURE — 99212 OFFICE O/P EST SF 10 MIN: CPT | Performed by: PODIATRIST

## 2021-07-11 NOTE — PROGRESS NOTES
Lara Banegas is a 62year old female. Patient presents with:  Test Results: US, 6/15. Follow - Up: Right foot pain, ongoing. Pain scale 8/10 with activity.          HPI:   Patient returns to static clinic still complaining of pain in her right foot that Osteoarthritis    • Visual impairment     Glasses      Past Surgical History:   Procedure Laterality Date   • BACK SURGERY     • BENIGN BIOPSY RIGHT     • BREAST BIOPSY     •       x2   • CARPAL TUNNEL RELEASE     • CATARACT     • CORRECT BUNION,O Musculoskeletal: Patient has good muscle strength.     ASSESSMENT AND PLAN:   Diagnoses and all orders for this visit:    Partial tear of right Achilles tendon, initial encounter    Status post foot surgery    Right foot pain        Plan: Patient is a littl

## 2021-07-27 ENCOUNTER — TELEPHONE (OUTPATIENT)
Dept: PODIATRY CLINIC | Facility: CLINIC | Age: 61
End: 2021-07-27

## 2021-07-27 NOTE — TELEPHONE ENCOUNTER
Patient called to request pain medication. Stated she was in a lot of pain from the surgery. Pharmacy confirmed correct.

## 2021-07-28 RX ORDER — MELOXICAM 15 MG/1
TABLET ORAL
Qty: 30 TABLET | Refills: 1 | Status: SHIPPED | OUTPATIENT
Start: 2021-07-28

## 2021-07-28 NOTE — TELEPHONE ENCOUNTER
Please call in Mobic 15 mg tablets #30, 1 refill instructions take 1 tablet daily p.o. discontinue if stomach upset arises

## 2021-07-28 NOTE — TELEPHONE ENCOUNTER
LMTCB on pt's preferred # -- 2nd try. Informed that [facility] received her message and wanted to order Rx for her. Asked pt to call back. * Note - mobic sent to pt's preferred pharmacy.

## 2021-08-05 ENCOUNTER — OFFICE VISIT (OUTPATIENT)
Dept: PODIATRY CLINIC | Facility: CLINIC | Age: 61
End: 2021-08-05
Payer: COMMERCIAL

## 2021-08-05 DIAGNOSIS — M67.88 ACHILLES TENDINOSIS OF RIGHT LOWER EXTREMITY: ICD-10-CM

## 2021-08-05 DIAGNOSIS — Z98.890 STATUS POST FOOT SURGERY: ICD-10-CM

## 2021-08-05 DIAGNOSIS — S86.011A PARTIAL TEAR OF RIGHT ACHILLES TENDON, INITIAL ENCOUNTER: Primary | ICD-10-CM

## 2021-08-05 PROCEDURE — 99212 OFFICE O/P EST SF 10 MIN: CPT | Performed by: PODIATRIST

## 2021-08-05 NOTE — PROGRESS NOTES
Polo Nguyen is a 62year old female. Patient presents with: Follow - Up: F/U right achilles tendon partial tear/tendinosis. States 5% improved  Slowly improving. Pain 6.   Increases with walking        HPI:   Patient is now about 8 months postoperati per patient did not have it   • Colon polyps     dx'd in 4/22012   • CTS (carpal tunnel syndrome)    • Diverticulosis    • Esophageal reflux    • Facial mass     benign   • History of blood transfusion 2007   • Measles    • Osteoarthritis    • Visual impai skin on her right foot and ankle was evaluated does have some thickening of the Achilles tendon little sensitivity on palpation. 2. Vascular: Patient has palpable pulses both dorsalis pedis and posterior tibial   3.  Neurologic: Patient has intact sensori

## 2021-08-31 ENCOUNTER — TELEPHONE (OUTPATIENT)
Dept: PODIATRY CLINIC | Facility: CLINIC | Age: 61
End: 2021-08-31

## 2021-08-31 NOTE — TELEPHONE ENCOUNTER
Patient states her current medication is upsetting her stomach. Would like to be put on her previous rx.      Pls advise

## 2021-08-31 NOTE — TELEPHONE ENCOUNTER
Spoke with patient. States the meloxicam upset her stomach and gave her diarrhea. States she has been compliant with activity restrictions and continues to have severe pain. She is requesting refill hydrocodone.   Advised patient this is a narcotic medic

## 2021-09-01 RX ORDER — HYDROCODONE BITARTRATE AND ACETAMINOPHEN 5; 325 MG/1; MG/1
1 TABLET ORAL EVERY 6 HOURS PRN
Qty: 15 TABLET | Refills: 0 | Status: SHIPPED | OUTPATIENT
Start: 2021-09-01 | End: 2021-12-06

## 2021-09-01 NOTE — TELEPHONE ENCOUNTER
Per pt returning a call. Per pt she is at work and if she does not answer please leave a message.  Please advise

## 2021-09-01 NOTE — TELEPHONE ENCOUNTER
Please let the patient know refills 15 but I need to see her than worse and she may need a 2nd opinion with Dr. Burns Overall thank you

## 2021-09-03 ENCOUNTER — TELEPHONE (OUTPATIENT)
Dept: PODIATRY CLINIC | Facility: CLINIC | Age: 61
End: 2021-09-03

## 2021-09-03 NOTE — TELEPHONE ENCOUNTER
Patient currently is working with restrictions per [facility]. Patient currently is till in physical therapy and is scheduled for her last session on 9/24/2021. Patient would like to know if she can get a note extending her restrictions till then?      Would like

## 2021-09-28 ENCOUNTER — LAB ENCOUNTER (OUTPATIENT)
Dept: LAB | Age: 61
End: 2021-09-28
Attending: INTERNAL MEDICINE
Payer: COMMERCIAL

## 2021-09-28 DIAGNOSIS — Z01.818 PRE-OP TESTING: ICD-10-CM

## 2021-10-01 ENCOUNTER — ANESTHESIA EVENT (OUTPATIENT)
Dept: ENDOSCOPY | Facility: HOSPITAL | Age: 61
End: 2021-10-01
Payer: COMMERCIAL

## 2021-10-01 ENCOUNTER — HOSPITAL ENCOUNTER (OUTPATIENT)
Facility: HOSPITAL | Age: 61
Setting detail: HOSPITAL OUTPATIENT SURGERY
Discharge: HOME | End: 2021-10-01
Attending: INTERNAL MEDICINE | Admitting: INTERNAL MEDICINE
Payer: COMMERCIAL

## 2021-10-01 ENCOUNTER — ANESTHESIA (OUTPATIENT)
Dept: ENDOSCOPY | Facility: HOSPITAL | Age: 61
End: 2021-10-01
Payer: COMMERCIAL

## 2021-10-01 VITALS
OXYGEN SATURATION: 99 % | RESPIRATION RATE: 13 BRPM | HEART RATE: 62 BPM | SYSTOLIC BLOOD PRESSURE: 144 MMHG | BODY MASS INDEX: 33.26 KG/M2 | DIASTOLIC BLOOD PRESSURE: 70 MMHG | HEIGHT: 59 IN | WEIGHT: 165 LBS | TEMPERATURE: 98 F

## 2021-10-01 DIAGNOSIS — R19.7 DIARRHEA, UNSPECIFIED TYPE: ICD-10-CM

## 2021-10-01 DIAGNOSIS — Z12.11 COLON CANCER SCREENING: ICD-10-CM

## 2021-10-01 DIAGNOSIS — K92.1 BLOOD IN STOOL: ICD-10-CM

## 2021-10-01 DIAGNOSIS — Z01.818 PRE-OP TESTING: Primary | ICD-10-CM

## 2021-10-01 DIAGNOSIS — K21.9 GASTROESOPHAGEAL REFLUX DISEASE, UNSPECIFIED WHETHER ESOPHAGITIS PRESENT: ICD-10-CM

## 2021-10-01 DIAGNOSIS — K59.00 CONSTIPATION, UNSPECIFIED CONSTIPATION TYPE: ICD-10-CM

## 2021-10-01 PROCEDURE — 0DBM8ZX EXCISION OF DESCENDING COLON, VIA NATURAL OR ARTIFICIAL OPENING ENDOSCOPIC, DIAGNOSTIC: ICD-10-PCS | Performed by: INTERNAL MEDICINE

## 2021-10-01 PROCEDURE — 0DB68ZX EXCISION OF STOMACH, VIA NATURAL OR ARTIFICIAL OPENING ENDOSCOPIC, DIAGNOSTIC: ICD-10-PCS | Performed by: INTERNAL MEDICINE

## 2021-10-01 PROCEDURE — 45380 COLONOSCOPY AND BIOPSY: CPT | Performed by: INTERNAL MEDICINE

## 2021-10-01 PROCEDURE — 43239 EGD BIOPSY SINGLE/MULTIPLE: CPT | Performed by: INTERNAL MEDICINE

## 2021-10-01 RX ORDER — SODIUM CHLORIDE, SODIUM LACTATE, POTASSIUM CHLORIDE, CALCIUM CHLORIDE 600; 310; 30; 20 MG/100ML; MG/100ML; MG/100ML; MG/100ML
INJECTION, SOLUTION INTRAVENOUS CONTINUOUS
Status: DISCONTINUED | OUTPATIENT
Start: 2021-10-01 | End: 2021-10-01

## 2021-10-01 RX ORDER — MIDAZOLAM HYDROCHLORIDE 1 MG/ML
INJECTION INTRAMUSCULAR; INTRAVENOUS AS NEEDED
Status: DISCONTINUED | OUTPATIENT
Start: 2021-10-01 | End: 2021-10-01 | Stop reason: SURG

## 2021-10-01 RX ADMIN — MIDAZOLAM HYDROCHLORIDE 2 MG: 1 INJECTION INTRAMUSCULAR; INTRAVENOUS at 12:19:00

## 2021-10-01 RX ADMIN — SODIUM CHLORIDE, SODIUM LACTATE, POTASSIUM CHLORIDE, CALCIUM CHLORIDE: 600; 310; 30; 20 INJECTION, SOLUTION INTRAVENOUS at 12:05:00

## 2021-10-01 NOTE — H&P
Pre Procedure History & Physical Examination    Patient Name: Vel Goldsmith  MRN: [id number]  CSN: [id number]  YOB: 1960    Diagnosis: reflux and screening colonoscopy. ?  Hx of polyps and now with bleeding and thinks it is hemorrhoids    acet • Anxiety    • Asthma    • Bronchitis    • Chicken pox     per patient did not have it   • Colon polyps     dx'd in 4/22012   • CTS (carpal tunnel syndrome)    • Diverticulosis    • Esophageal reflux    • Facial mass     benign   • History of blood trans BMI 33.33 kg/m²       Gen: Patient appears comfortable and in no acute discomfort  HEENT: the sclera appears anicteric, oropharynx clear, mucus membranes appear moist  CV: regular rate and rhythm, the extremities are warm and well perfused   Lung: Moves ai

## 2021-10-01 NOTE — OPERATIVE REPORT
ESOPHAGOGASTRODUODENOSCOPY (EGD) & COLONOSCOPY REPORT    Elisha Lila ADAIR 1960 Age 62year old   PCP Concepción Leach MD Endoscopist Yisel David MD     Date of procedure: 10/01/21    Procedure: EGD w/ biopsies & Colonoscopy w/cold biopsy polypec with washing. I then carefully withdrew the instrument from the patient who tolerated the procedure well. Complications: None    EGD findings:      1. Esophagus: The squamocolumnar junction was noted at 36 cm and appeared regular.  The GE junction was n

## 2021-10-01 NOTE — ANESTHESIA POSTPROCEDURE EVALUATION
Patient: Colonel Gonzales    Procedure Summary     Date: 10/01/21 Room / Location: [facility] ENDOSCOPY 04 / [facility] END[facility]COPY    Anesthesia Start: 1218 Anesthesia Stop:     Procedures:       COLONOSCOPY (N/A )      ESOPHAGOGASTRODUODENOSCOPY (EGD) (N/A ) Diagnosis:

## 2021-10-01 NOTE — ANESTHESIA PREPROCEDURE EVALUATION
Anesthesia PreOp Note    HPI:     Elisha Sharp is a 62year old female who presents for preoperative consultation requested by: Ted Benitez MD    Date of Surgery: 10/1/2021    Procedure(s):  COLONOSCOPY  ESOPHAGOGASTRODUODENOSCOPY (EGD)  Indication: Co 6 (six) hours as needed for Pain., Disp: 15 tablet, Rfl: 0, 9/22/2021  Meloxicam 15 MG Oral Tab, Take 1 tablet daily by mouth.  Discontinue if you have stomach upset., Disp: 30 tablet, Rfl: 1, 9/29/2021  lisinopril 2.5 MG Oral Tab, Take 1 tablet (2.5 mg tot name: Not on file      Number of children: Not on file      Years of education: Not on file      Highest education level: Not on file    Occupational History      Not on file    Tobacco Use      Smoking status: Never Smoker      Smokeless tobacco: Never Us Partner Violence:       Fear of Current or Ex-Partner: Not on file      Emotionally Abused: Not on file      Physically Abused: Not on file      Sexually Abused: Not on file  Housing Stability:       Unable to Pay for Housing in the Last Year: Not on file desires the anesthetic management as planned.   Ky Delarosa MD  10/1/2021 12:14 PM

## 2021-10-14 ENCOUNTER — TELEPHONE (OUTPATIENT)
Dept: PODIATRY CLINIC | Facility: CLINIC | Age: 61
End: 2021-10-14

## 2021-10-14 NOTE — TELEPHONE ENCOUNTER
Harpreet Vaughan @ [employer] (patients disability company) called to confirm the patient received letter restricting the patient till 9/24/21 and that she has a follow up with PCP.

## 2021-10-21 ENCOUNTER — OFFICE VISIT (OUTPATIENT)
Dept: PODIATRY CLINIC | Facility: CLINIC | Age: 61
End: 2021-10-21
Payer: COMMERCIAL

## 2021-10-21 ENCOUNTER — PATIENT MESSAGE (OUTPATIENT)
Dept: PODIATRY CLINIC | Facility: CLINIC | Age: 61
End: 2021-10-21

## 2021-10-21 DIAGNOSIS — S86.011A PARTIAL TEAR OF RIGHT ACHILLES TENDON, INITIAL ENCOUNTER: Primary | ICD-10-CM

## 2021-10-21 DIAGNOSIS — Z98.890 STATUS POST FOOT SURGERY: ICD-10-CM

## 2021-10-21 DIAGNOSIS — M67.88 ACHILLES TENDINOSIS OF RIGHT LOWER EXTREMITY: ICD-10-CM

## 2021-10-21 PROCEDURE — 99212 OFFICE O/P EST SF 10 MIN: CPT | Performed by: PODIATRIST

## 2021-10-21 NOTE — PROGRESS NOTES
Betina Hatfield is a 62year old female. Patient presents with: Follow - Up: right achilles tendon f/u completed PT. per pt therapist is advising more PT. Denies any pain.         HPI:   Patient was seen at today's visit for postoperative checkup she says t topically 4 (four) times daily.  (Patient not taking: Reported on 10/21/2021)        Past Medical History:   Diagnosis Date   • Anxiety    • Asthma    • Bronchitis    • Chicken pox     per patient did not have it   • Colon polyps     dx'd in 4/22012   • CTS denies headaches    EXAM:   [facility] 08/01/2017   GENERAL: well developed, well nourished, in no apparent distress  EXTREMITIES:   The skin on the posterior aspect of her right heel is well-healed there is no sign of infection she does not have a defect in the

## 2021-10-25 ENCOUNTER — TELEPHONE (OUTPATIENT)
Dept: PODIATRY CLINIC | Facility: CLINIC | Age: 61
End: 2021-10-25

## 2021-10-25 NOTE — TELEPHONE ENCOUNTER
Patient called (requested a Slovenian Speaker to call back) to request a letter with more specific reasons for her time off work. Stated that her employer is requesting more specifications as to the reason and time length.

## 2021-10-25 NOTE — TELEPHONE ENCOUNTER
Pt asking for new note to be faxed to [phone]  And a copy to be put into [employer]     Pt asking for this to be added to note - she needs additional break until 12/5

## 2021-10-26 ENCOUNTER — TELEPHONE (OUTPATIENT)
Dept: PODIATRY CLINIC | Facility: CLINIC | Age: 61
End: 2021-10-26

## 2021-10-26 DIAGNOSIS — M76.62 ACHILLES TENDINITIS OF BOTH LOWER EXTREMITIES: Primary | ICD-10-CM

## 2021-10-26 DIAGNOSIS — M76.61 ACHILLES TENDINITIS OF BOTH LOWER EXTREMITIES: Primary | ICD-10-CM

## 2021-10-26 NOTE — TELEPHONE ENCOUNTER
Please call the patient let her know that the external physical therapy order is written please fax the order to the [facility] physical therapy department thank you

## 2021-10-28 NOTE — TELEPHONE ENCOUNTER
Dr Newt Fleischer please specify the length of the work break.
From: Malini Verdugo  To: Connor Uriarte DPM  Sent: 10/21/2021 1:38 PM CDT  Subject: Work letter    I need a different blue note to be more specific as to how long should be.
Mychart reply sent to patient.
Ok that fine
Patient calling checking on the status. Patient states that her employer is not granting the additional break because the letter is not specific.  Patient would like the note to make sure that this break should still remain in place until the next offic
Please confirm how long her breaks should be - 10 min, 20 min?
Returning RN call, patient would leave the length of her break to the MD.     Patient would like to break around 4pm    Once updated would like letter faxed to employer fax# [phone]
The brace should be no longer than 20 minutes
no

## 2021-11-02 NOTE — TELEPHONE ENCOUNTER
Patient called to let Dr. Jamey Morrison know that after her work received her note with restrictions they sent her home and gave her paperwork for long term disability.  She visited her PCP and had them fill those out Yes...

## 2021-11-08 ENCOUNTER — TELEPHONE (OUTPATIENT)
Dept: GASTROENTEROLOGY | Facility: CLINIC | Age: 61
End: 2021-11-08

## 2021-11-08 NOTE — TELEPHONE ENCOUNTER
----- Message from Gladis Guo MD sent at 11/8/2021 10:35 AM CST -----  GI staff: please place recall in for colonoscopy in 10 years     Treat for Hpylori and confirm negative after 8 weeks of completing therapy

## 2021-11-08 NOTE — TELEPHONE ENCOUNTER
Pt was contacted utilizing language line solutions  and discussed MD message below as well as the following education regarding H pylori treatment.      Entered recall into Epic: Per Dr. Zena Dalal, repeat h.pylori stool test due approximately taking the antibiotics as they interact with each other. You can restart them after finishing the antibiotics. • Do not drink any alcohol while on the treatment. • You may take antibiotics with food to avoid stomach upset.     What should I know about reaction to the medications you may contact our office at 23-14-20-09.

## 2021-11-08 NOTE — TELEPHONE ENCOUNTER
Entered into Epic. Recall CLN in 10 years per Dr. Asa Garcia. Last CLN done 10/01/2021. Recall entered into Patient Outreach for 10/01/2031. HM updated.

## 2021-11-08 NOTE — TELEPHONE ENCOUNTER
Entered recall into Epic: Per Dr. Yumiko Haines, repeat h.pylori stool test due approximately: 01/18/2022. Refer to other TE dated 11/08/2021 for h pylori results and teaching reviewed with patient.

## 2021-11-09 RX ORDER — LISINOPRIL 2.5 MG/1
2.5 TABLET ORAL DAILY
Qty: 90 TABLET | Refills: 0 | Status: SHIPPED | OUTPATIENT
Start: 2021-11-09 | End: 2022-02-20

## 2021-11-09 NOTE — TELEPHONE ENCOUNTER
Please review. Protocol failed / No protocol. 90 day refill given on 11/09/21, appointment needed for further refills. Requested Prescriptions   Pending Prescriptions Disp Refills    LISINOPRIL 2.5 MG Oral Tab [Pharmacy Med Name: LISINOPRIL 2.5MG TABLETS] 90 tablet 0     Sig: TAKE 1 TABLET(2.5 MG) BY MOUTH DAILY        Hypertensive Medications Protocol Failed - 11/9/2021 10:59 AM        Failed - Appointment in past 6 or next 3 months        Passed - CMP or BMP in past 12 months        Passed - GFR Non- > 50     Lab Results   Component Value Date    GFRNAA 84 04/09/2021                      Future Appointments         Provider Department Appt Notes    In 1 month Yesica Horner, [facility], [facility], [address] [place] [address] Follow up           Recent Outpatient Visits              2 weeks ago Partial tear of right Achilles tendon, initial encounter    [address] [place] [address], [facility] [address] [place] [address]    Office Visit    3 months ago Partial tear of right Achilles tendon, initial encounter    [address] [place] [address], [facility] [address] [place] [address]    Office Visit    4 months ago Partial tear of right Achilles tendon, initial encounter    [facility], [facility].  [facility] [place] Yesica Horner, CRAIG    Office Visit    5 months ago Colon cancer screening    [facility], [facility], [facility], Steve Parada APRN    Office Visit    5 months ago Achilles tendinosis of right lower extremity    [address] [place] [address], [facility], [facility] [address] [place] [address]    Office Visit

## 2021-12-03 ENCOUNTER — TELEPHONE (OUTPATIENT)
Dept: FAMILY MEDICINE CLINIC | Facility: CLINIC | Age: 61
End: 2021-12-03

## 2021-12-03 NOTE — TELEPHONE ENCOUNTER
Pt advised , pt coughing , cough sounds tight , relayed Dr message , pt verbalized understanding , stated she will try one more night

## 2021-12-03 NOTE — TELEPHONE ENCOUNTER
Patient calling stating  she was seen in immediate care facility  in [address] for cough, headache , and fever on Tuesday . Per patient, she was tested for COVID-19 and it was negative.    Patient states she was advised to take an OTC cough medication, T

## 2021-12-06 ENCOUNTER — OFFICE VISIT (OUTPATIENT)
Dept: FAMILY MEDICINE CLINIC | Facility: CLINIC | Age: 61
End: 2021-12-06
Payer: COMMERCIAL

## 2021-12-06 VITALS
DIASTOLIC BLOOD PRESSURE: 72 MMHG | HEIGHT: 59 IN | SYSTOLIC BLOOD PRESSURE: 131 MMHG | BODY MASS INDEX: 32.99 KG/M2 | HEART RATE: 76 BPM | WEIGHT: 163.63 LBS

## 2021-12-06 DIAGNOSIS — B34.9 ACUTE VIRAL SYNDROME: Primary | ICD-10-CM

## 2021-12-06 DIAGNOSIS — K63.5 POLYP OF COLON, UNSPECIFIED PART OF COLON, UNSPECIFIED TYPE: ICD-10-CM

## 2021-12-06 DIAGNOSIS — A04.8 H. PYLORI INFECTION: ICD-10-CM

## 2021-12-06 PROCEDURE — 3075F SYST BP GE 130 - 139MM HG: CPT | Performed by: FAMILY MEDICINE

## 2021-12-06 PROCEDURE — 3008F BODY MASS INDEX DOCD: CPT | Performed by: FAMILY MEDICINE

## 2021-12-06 PROCEDURE — 99213 OFFICE O/P EST LOW 20 MIN: CPT | Performed by: FAMILY MEDICINE

## 2021-12-06 PROCEDURE — 3078F DIAST BP <80 MM HG: CPT | Performed by: FAMILY MEDICINE

## 2021-12-06 NOTE — PROGRESS NOTES
12/6/2021  4:14 PM    Sara Salinas is a 62year old female.     Chief complaint(s): Patient presents with:  Urgent Care F/u: URI -negative covid test, sx congestion, post nasaldrip, sinus pressure    HPI:     Sara Salinas primary complaint is regarding Alcohol/week: 0.0 standard drinks      Comment: sometimes/social basis only-beer    Drug use: No       Immunizations:     Immunization History  Administered            Date(s) Administered    Covid-19 Vaccine Blanca (J&J) 0.5ml                          03 cough. Negative for shortness of breath. Cardiovascular: Negative for chest pain. Gastrointestinal: Negative for abdominal pain, nausea and vomiting. Musculoskeletal: Negative for back pain. Skin: Negative for rash.    Neurological: Negative for [facility] Specimens:   A) - Gastric biopsy, gastric erythema  gastropathy  r/o h pylori                                    B) - Colon descending, polyp x1                                                            Final Diagnosis: Notify Dr Valente Denise or the [facility], [facility] if there is a deterioration or worsening of the medical condition. Also, inform the doctor with any new symptoms or medications' side effects. RTW this thursaday    FOLLOW-UP: Schedule a follow-up visit in  prn.

## 2021-12-09 ENCOUNTER — OFFICE VISIT (OUTPATIENT)
Dept: PODIATRY CLINIC | Facility: CLINIC | Age: 61
End: 2021-12-09
Payer: COMMERCIAL

## 2021-12-09 DIAGNOSIS — M76.62 ACHILLES TENDINITIS OF BOTH LOWER EXTREMITIES: Primary | ICD-10-CM

## 2021-12-09 DIAGNOSIS — M76.61 ACHILLES TENDINITIS OF BOTH LOWER EXTREMITIES: Primary | ICD-10-CM

## 2021-12-09 PROCEDURE — 99213 OFFICE O/P EST LOW 20 MIN: CPT | Performed by: PODIATRIST

## 2021-12-09 NOTE — PROGRESS NOTES
Sara Salinas is a 62year old female. Patient presents with: Follow - Up: Right foot , achilles tendon. Patient completed physical therapy with success.         HPI:   Patient returns to the clinic she is doing much better she has completed physical ther CATARACT     • COLONOSCOPY N/A 10/1/2021    Procedure: COLONOSCOPY;  Surgeon: Travon Ferreira MD;  Location: [address] [place] [address] ENDOSCOPY   • CORRECT BUNION,OTHR METHODS     • EYE SURGERY     • FOOT SURGERY Right    • REPAIR ROTATOR CUFF,ACUTE Left     1/2014   • SPINE KEELY now for 2 months. We will continue restrictions where she works no more than 40 hours/week and she requires 3 breaks per day.   Note was dispensed if she notices any problems she will return the clinic sooner otherwise I will see her in follow-up in 2 emmanuelle

## 2022-01-21 ENCOUNTER — TELEPHONE (OUTPATIENT)
Dept: GASTROENTEROLOGY | Facility: CLINIC | Age: 62
End: 2022-01-21

## 2022-01-21 DIAGNOSIS — A04.8 H. PYLORI INFECTION: Primary | ICD-10-CM

## 2022-01-21 NOTE — TELEPHONE ENCOUNTER
Patient outreach message received:    Entered recall into Epic: Per Dr. Jennifer Cox, repeat h.pylori stool test due approximately: 01/18/2022.

## 2022-01-21 NOTE — TELEPHONE ENCOUNTER
Dr. Maria E Deluna--    Patient is due for eradication testing. Please review and sign pended orders if appropriate. Thank you!

## 2022-01-24 NOTE — TELEPHONE ENCOUNTER
Reached out to [facility] utilizing [employer] #[id number]. Discussed extensively rationale for call and reasoning for follow up eradication testing.      Instructed that stool kit may be retrieved at any Replaced by [facility] lab location at her earliest

## 2022-02-01 ENCOUNTER — LAB ENCOUNTER (OUTPATIENT)
Dept: LAB | Age: 62
End: 2022-02-01
Attending: INTERNAL MEDICINE
Payer: COMMERCIAL

## 2022-02-01 DIAGNOSIS — A04.8 H. PYLORI INFECTION: ICD-10-CM

## 2022-02-01 PROCEDURE — 87338 HPYLORI STOOL AG IA: CPT

## 2022-02-03 LAB — HELICOBACTER PYLORI AG, FECAL: NEGATIVE

## 2022-02-10 ENCOUNTER — OFFICE VISIT (OUTPATIENT)
Dept: PODIATRY CLINIC | Facility: CLINIC | Age: 62
End: 2022-02-10
Payer: COMMERCIAL

## 2022-02-10 ENCOUNTER — TELEPHONE (OUTPATIENT)
Dept: FAMILY MEDICINE CLINIC | Facility: CLINIC | Age: 62
End: 2022-02-10

## 2022-02-10 ENCOUNTER — TELEPHONE (OUTPATIENT)
Dept: GASTROENTEROLOGY | Facility: CLINIC | Age: 62
End: 2022-02-10

## 2022-02-10 DIAGNOSIS — S86.011A PARTIAL TEAR OF RIGHT ACHILLES TENDON, INITIAL ENCOUNTER: Primary | ICD-10-CM

## 2022-02-10 PROCEDURE — 99213 OFFICE O/P EST LOW 20 MIN: CPT | Performed by: PODIATRIST

## 2022-02-10 NOTE — TELEPHONE ENCOUNTER
Patient calling to review results form egd/cln,please call with  at [phone],[facility].

## 2022-02-17 ENCOUNTER — HOSPITAL ENCOUNTER (OUTPATIENT)
Dept: MRI IMAGING | Age: 62
Discharge: HOME | End: 2022-02-17
Attending: PODIATRIST
Payer: COMMERCIAL

## 2022-02-17 DIAGNOSIS — S86.011A PARTIAL TEAR OF RIGHT ACHILLES TENDON, INITIAL ENCOUNTER: ICD-10-CM

## 2022-02-17 PROCEDURE — 73721 MRI JNT OF LWR EXTRE W/O DYE: CPT | Performed by: PODIATRIST

## 2022-02-20 RX ORDER — LISINOPRIL 2.5 MG/1
2.5 TABLET ORAL DAILY
Qty: 90 TABLET | Refills: 1 | Status: SHIPPED | OUTPATIENT
Start: 2022-02-20

## 2022-02-21 NOTE — TELEPHONE ENCOUNTER
Refill passed per [facility] protocol   Requested Prescriptions   Pending Prescriptions Disp Refills    LISINOPRIL 2.5 MG Oral Tab [Pharmacy Med Name: LISINOPRIL 2.5MG TABLETS] 90 tablet 0     Sig: TAKE 1 TABLET(2.5 MG) BY MOUTH DAILY        Hypertensive Medications Protocol Passed - 2/19/2022  5:02 PM        Passed - CMP or BMP in past 12 months        Passed - Appointment in past 6 or next 3 months        Passed - GFR Non- > 50     Lab Results   Component Value Date    GFRNAA 84 04/09/2021

## 2022-02-22 ENCOUNTER — TELEPHONE (OUTPATIENT)
Dept: PODIATRY CLINIC | Facility: CLINIC | Age: 62
End: 2022-02-22

## 2022-02-23 NOTE — TELEPHONE ENCOUNTER
Please have patient call and schedule an appointment she may need a procedure I have to reevaluate thank you

## 2022-02-23 NOTE — TELEPHONE ENCOUNTER
Called loraine bhatti and s/w Taras Ahumada #[phone] for Lao translation- he called pt and informed her she needs appt to go over results. appt made on 2/24 @ 10am at [facility].

## 2022-02-24 ENCOUNTER — TELEPHONE (OUTPATIENT)
Dept: PODIATRY CLINIC | Facility: CLINIC | Age: 62
End: 2022-02-24

## 2022-02-24 ENCOUNTER — OFFICE VISIT (OUTPATIENT)
Dept: PODIATRY CLINIC | Facility: CLINIC | Age: 62
End: 2022-02-24
Payer: COMMERCIAL

## 2022-02-24 DIAGNOSIS — M67.88 ACHILLES TENDINOSIS OF RIGHT LOWER EXTREMITY: Primary | ICD-10-CM

## 2022-02-24 PROCEDURE — 99213 OFFICE O/P EST LOW 20 MIN: CPT | Performed by: PODIATRIST

## 2022-02-24 NOTE — TELEPHONE ENCOUNTER
Procedure: Topaz procedure right Achilles tendon  CPT code: 45631?   Length of Surgery: 45 minutes  Any Instruments: Topaz microablator  Call patient: within 24 hours  Anesthesia: MAC  Location: [facility]  Assistance: none  Pacemaker: No  Anticoagulants: No  Nickel Allergy: No  Latex Allergy: No  Diagnosis/ICD Code:   (M67.88) Achilles tendinosis of right lower extremity  (primary encounter diagnosis)  Plan:

## 2022-02-25 NOTE — TELEPHONE ENCOUNTER
Patient wanted to know the following before scheduling surgery. How long is recovery/ time off work  When would the surgery be scheduled for. [facility] and unsure if she would need assistance.      Patient is [place] speaking

## 2022-03-03 NOTE — TELEPHONE ENCOUNTER
Called patient this date with Rosa Payne [phone] with language line. Informed her that she would be off of work for approximately 2 weeks if work will allow her to return in a walking boot otherwise 3-4 weeks. She relates that she cannot wear a walking back at work so then I told her to plan on 4 weeks off of work. She is requesting surgery be scheduled on 4/15/22 which was done this date at [address] Managed care order placed this date. Patient was told I would call her next week to review the details. [place] and requests a call back the week of March 13th in the mornings as she starts work at 1:00 p.m.

## 2022-03-16 NOTE — TELEPHONE ENCOUNTER
Called patient with Baldo Horvath YHERNANDEZ#[phone] with language line. Confirmed surgical date and time with patient. Reviewed pre-op instructions and patient relates she would like to have her COVID test done locally. Order placed this date and attempted to give patient the number for central scheduling but she had nothing to write with and requests that I call her back.

## 2022-03-21 NOTE — TELEPHONE ENCOUNTER
Called patient this date with #[phone] Viviano Bloch to schedule her post op visit and give central scheduling for covid test.  Patient did not answer and  left a messgae for her to call me back directly at [phone]

## 2022-03-23 ENCOUNTER — TELEPHONE (OUTPATIENT)
Dept: PODIATRY CLINIC | Facility: CLINIC | Age: 62
End: 2022-03-23

## 2022-03-24 NOTE — TELEPHONE ENCOUNTER
Patient called back and spoke with Neftaly Pate and scheduled her post op appointment. She also left another message for me today wanted to verify that her surgery is on Good Friday. Called patient this date with  Rickie Aschoff LN#[id number] and we left a message confirming surgery is on 4/15/22 at 7:30 a.m. at [facility]. She was instructed to contact our office with any further questions.

## 2022-04-08 ENCOUNTER — TELEPHONE (OUTPATIENT)
Dept: PODIATRY CLINIC | Facility: CLINIC | Age: 62
End: 2022-04-08

## 2022-04-08 ENCOUNTER — OFFICE VISIT (OUTPATIENT)
Dept: FAMILY MEDICINE CLINIC | Facility: CLINIC | Age: 62
End: 2022-04-08
Payer: COMMERCIAL

## 2022-04-08 VITALS
HEIGHT: 59 IN | SYSTOLIC BLOOD PRESSURE: 150 MMHG | DIASTOLIC BLOOD PRESSURE: 75 MMHG | WEIGHT: 164 LBS | BODY MASS INDEX: 33.06 KG/M2 | HEART RATE: 58 BPM

## 2022-04-08 DIAGNOSIS — L30.9 DERMATITIS: Primary | ICD-10-CM

## 2022-04-08 DIAGNOSIS — S39.012A STRAIN OF LUMBAR REGION, INITIAL ENCOUNTER: ICD-10-CM

## 2022-04-08 DIAGNOSIS — T78.40XA ALLERGIC REACTION, INITIAL ENCOUNTER: ICD-10-CM

## 2022-04-08 PROCEDURE — 3008F BODY MASS INDEX DOCD: CPT | Performed by: NURSE PRACTITIONER

## 2022-04-08 PROCEDURE — 3077F SYST BP >= 140 MM HG: CPT | Performed by: NURSE PRACTITIONER

## 2022-04-08 PROCEDURE — 3078F DIAST BP <80 MM HG: CPT | Performed by: NURSE PRACTITIONER

## 2022-04-08 PROCEDURE — 99213 OFFICE O/P EST LOW 20 MIN: CPT | Performed by: NURSE PRACTITIONER

## 2022-04-08 RX ORDER — TIZANIDINE 4 MG/1
4 TABLET ORAL NIGHTLY PRN
COMMUNITY
Start: 2022-04-04

## 2022-04-08 RX ORDER — DIAPER,BRIEF,INFANT-TODD,DISP
EACH MISCELLANEOUS 2 TIMES DAILY
COMMUNITY

## 2022-04-08 RX ORDER — LISINOPRIL 2.5 MG/1
2.5 TABLET ORAL DAILY
Qty: 90 TABLET | Refills: 1 | Status: SHIPPED | OUTPATIENT
Start: 2022-04-08

## 2022-04-08 RX ORDER — IBUPROFEN 600 MG/1
TABLET ORAL
COMMUNITY
Start: 2022-04-04

## 2022-04-08 RX ORDER — METHYLPREDNISOLONE 4 MG/1
TABLET ORAL
Qty: 21 EACH | Refills: 0 | Status: SHIPPED | OUTPATIENT
Start: 2022-04-08

## 2022-04-08 NOTE — TELEPHONE ENCOUNTER
Per pt has question regarding medications, please advise made aware clinical staff will be back Monday.

## 2022-04-12 ENCOUNTER — LAB ENCOUNTER (OUTPATIENT)
Dept: LAB | Age: 62
End: 2022-04-12
Attending: PODIATRIST
Payer: COMMERCIAL

## 2022-04-12 DIAGNOSIS — M67.88 ACHILLES TENDINOSIS OF RIGHT LOWER EXTREMITY: ICD-10-CM

## 2022-04-12 NOTE — TELEPHONE ENCOUNTER
Spoke with patient hospital called patient today and went over medications she could and could not take.

## 2022-04-12 NOTE — TELEPHONE ENCOUNTER
Pt is returning the nurses call.  Pt states that she is avail after 5:30pm or she is avail in the morning btwn 9am - 1pm.

## 2022-04-12 NOTE — TELEPHONE ENCOUNTER
Dr. Bel Huitron,     Please sign off on form: fmla 4/15/22-1-4 wks post op  -Highlight the patient and hit \"Chart\" button. -In Chart Review, w/in the Encounter tab - click 1 time on the Telephone call encounter for 3/23/22 Scroll down the telephone encounter.  -Click \"scan on\" blue Hyperlink under \"Media\" heading for la Dr Bel Huitron 4/12/22 w/in the telephone enc.  -Click on Acknowledge button at the bottom right corner and left-click onto image, signature stamp appears and drag signature to Provider signature line. Stamp will turn blue. Close window. Thank you,    Eb Reyna.

## 2022-04-13 LAB — SARS-COV-2 RNA RESP QL NAA+PROBE: NOT DETECTED

## 2022-04-14 ENCOUNTER — TELEPHONE (OUTPATIENT)
Dept: PODIATRY CLINIC | Facility: CLINIC | Age: 62
End: 2022-04-14

## 2022-04-15 ENCOUNTER — TELEPHONE (OUTPATIENT)
Dept: PODIATRY CLINIC | Facility: CLINIC | Age: 62
End: 2022-04-15

## 2022-04-15 ENCOUNTER — PROCEDURE (OUTPATIENT)
Dept: SURGERY | Age: 62
End: 2022-04-15

## 2022-04-15 PROCEDURE — 27654 REPAIR OF ACHILLES TENDON: CPT | Performed by: PODIATRIST

## 2022-04-15 RX ORDER — HYDROCODONE BITARTRATE AND ACETAMINOPHEN 5; 325 MG/1; MG/1
1 TABLET ORAL EVERY 6 HOURS PRN
Qty: 30 TABLET | Refills: 0 | Status: SHIPPED | OUTPATIENT
Start: 2022-04-15 | End: 2022-09-14

## 2022-04-21 ENCOUNTER — OFFICE VISIT (OUTPATIENT)
Dept: PODIATRY CLINIC | Facility: CLINIC | Age: 62
End: 2022-04-21
Payer: COMMERCIAL

## 2022-04-21 DIAGNOSIS — M67.88 ACHILLES TENDINOSIS OF RIGHT LOWER EXTREMITY: ICD-10-CM

## 2022-04-21 DIAGNOSIS — Z98.890 STATUS POST FOOT SURGERY: Primary | ICD-10-CM

## 2022-04-21 PROCEDURE — 99024 POSTOP FOLLOW-UP VISIT: CPT | Performed by: PODIATRIST

## 2022-04-21 NOTE — PROGRESS NOTES
[facility]     OPERATIVE REPORT    Shavon Mac    [facility] [id number] MRN [id number]    1960 Age 65year old   Admission Date (Not on file) Operation Date  4/15/2022   Attending Physician No att. 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 20 cc     HEMOSTASIS:   Direct pressure     ESTIMATED BLOOD LOSS:   2 cc     INDICATIONS:  This 65year old female presented with this patient had a retrocalcaneal spur removed. Developed some tearing in it which never resolved with chronic Achilles tendinitis and eventually chronic painful tendinosis     FINDINGS:   There is thickening of the Achilles tendon just above where the repair was carried out     SPECIMENS:   None     COMPLICATIONS:  None. DRAINS:   None     OPERATIVE TECHNIQUE:    This patient was brought into the operating with vital signs stable and placed in the supine position on the operating table. Appropriate timeout was taken all equipment was present, checked and functional.  All personnel were present there were no additions deletions or concerns reported. Intravenous sedation was administered and the posterior aspect of the heel was anesthetized using 20 cc of 0.5% Marcaine plain a ruler was carried out to measure the area and it was 3 cm x 5 cm and 5 mm increments were marked from proximal to distal and medial to lateral.  The lines were drawn in a graph like appearance. A 0.062 inch K wire was then used in order to create holes in the skin over the area of tendinosis. The Topaz micro ablator was then inserted and each hole was micro ablated varying between 5 mm, 3 mm and 1 mm as recommended.   After the surgery it was dressed with Adaptic sterile gauze Reji and an Ace wrap. In recovery the patient was placed into a knee-high cam boot and given restricted activities.     Lilian Leal DPM

## 2022-05-03 ENCOUNTER — OFFICE VISIT (OUTPATIENT)
Dept: PODIATRY CLINIC | Facility: CLINIC | Age: 62
End: 2022-05-03
Payer: COMMERCIAL

## 2022-05-03 DIAGNOSIS — M67.88 ACHILLES TENDINOSIS OF RIGHT LOWER EXTREMITY: ICD-10-CM

## 2022-05-03 DIAGNOSIS — Z98.890 STATUS POST FOOT SURGERY: ICD-10-CM

## 2022-05-03 PROCEDURE — 99024 POSTOP FOLLOW-UP VISIT: CPT | Performed by: PODIATRIST

## 2022-05-06 ENCOUNTER — TELEPHONE (OUTPATIENT)
Dept: PODIATRY CLINIC | Facility: CLINIC | Age: 62
End: 2022-05-06

## 2022-05-06 NOTE — TELEPHONE ENCOUNTER
Elva from [facility] phoned about patients need for PT but they will not be able to get her in until the week of May 16th and seeing if this will be ok since surgery was done. Please advise.

## 2022-05-06 NOTE — TELEPHONE ENCOUNTER
Spoke with patient using  let her know she can go somewhere else if she would like to get in sooner.  Patient declined at this time and stated she would wait to go to [facility].

## 2022-05-09 ENCOUNTER — ORDER TRANSCRIPTION (OUTPATIENT)
Dept: PHYSICAL THERAPY | Facility: HOSPITAL | Age: 62
End: 2022-05-09

## 2022-05-10 ENCOUNTER — APPOINTMENT (OUTPATIENT)
Dept: PHYSICAL THERAPY | Age: 62
End: 2022-05-10
Attending: PODIATRIST
Payer: COMMERCIAL

## 2022-05-13 ENCOUNTER — TELEPHONE (OUTPATIENT)
Dept: PHYSICAL THERAPY | Facility: HOSPITAL | Age: 62
End: 2022-05-13

## 2022-05-13 ENCOUNTER — OFFICE VISIT (OUTPATIENT)
Dept: PHYSICAL THERAPY | Age: 62
End: 2022-05-13
Attending: PODIATRIST
Payer: COMMERCIAL

## 2022-05-13 DIAGNOSIS — Z98.890 STATUS POST FOOT SURGERY: ICD-10-CM

## 2022-05-13 DIAGNOSIS — M67.88 ACHILLES TENDINOSIS OF RIGHT LOWER EXTREMITY: ICD-10-CM

## 2022-05-13 PROCEDURE — 97110 THERAPEUTIC EXERCISES: CPT

## 2022-05-13 PROCEDURE — 97161 PT EVAL LOW COMPLEX 20 MIN: CPT

## 2022-05-16 ENCOUNTER — OFFICE VISIT (OUTPATIENT)
Dept: PHYSICAL THERAPY | Age: 62
End: 2022-05-16
Attending: PODIATRIST
Payer: COMMERCIAL

## 2022-05-16 DIAGNOSIS — Z98.890 STATUS POST FOOT SURGERY: ICD-10-CM

## 2022-05-16 DIAGNOSIS — M67.88 ACHILLES TENDINOSIS OF RIGHT LOWER EXTREMITY: ICD-10-CM

## 2022-05-16 PROCEDURE — 97110 THERAPEUTIC EXERCISES: CPT

## 2022-05-16 PROCEDURE — 97140 MANUAL THERAPY 1/> REGIONS: CPT

## 2022-05-20 ENCOUNTER — TELEPHONE (OUTPATIENT)
Dept: PHYSICAL THERAPY | Facility: HOSPITAL | Age: 62
End: 2022-05-20

## 2022-05-24 ENCOUNTER — OFFICE VISIT (OUTPATIENT)
Dept: PHYSICAL THERAPY | Age: 62
End: 2022-05-24
Attending: PODIATRIST
Payer: COMMERCIAL

## 2022-05-24 DIAGNOSIS — Z98.890 STATUS POST FOOT SURGERY: ICD-10-CM

## 2022-05-24 DIAGNOSIS — M67.88 ACHILLES TENDINOSIS OF RIGHT LOWER EXTREMITY: ICD-10-CM

## 2022-05-24 PROCEDURE — 97110 THERAPEUTIC EXERCISES: CPT

## 2022-05-24 PROCEDURE — 97140 MANUAL THERAPY 1/> REGIONS: CPT

## 2022-05-26 ENCOUNTER — OFFICE VISIT (OUTPATIENT)
Dept: PHYSICAL THERAPY | Age: 62
End: 2022-05-26
Attending: PODIATRIST
Payer: COMMERCIAL

## 2022-05-26 DIAGNOSIS — Z98.890 STATUS POST FOOT SURGERY: ICD-10-CM

## 2022-05-26 DIAGNOSIS — M67.88 ACHILLES TENDINOSIS OF RIGHT LOWER EXTREMITY: ICD-10-CM

## 2022-05-26 PROCEDURE — 97110 THERAPEUTIC EXERCISES: CPT

## 2022-05-26 PROCEDURE — 97140 MANUAL THERAPY 1/> REGIONS: CPT

## 2022-06-02 ENCOUNTER — OFFICE VISIT (OUTPATIENT)
Dept: PODIATRY CLINIC | Facility: CLINIC | Age: 62
End: 2022-06-02
Payer: COMMERCIAL

## 2022-06-02 DIAGNOSIS — M67.88 ACHILLES TENDINOSIS OF RIGHT LOWER EXTREMITY: ICD-10-CM

## 2022-06-02 DIAGNOSIS — Z98.890 STATUS POST FOOT SURGERY: ICD-10-CM

## 2022-06-02 PROCEDURE — 99024 POSTOP FOLLOW-UP VISIT: CPT | Performed by: PODIATRIST

## 2022-06-03 ENCOUNTER — OFFICE VISIT (OUTPATIENT)
Dept: PHYSICAL THERAPY | Age: 62
End: 2022-06-03
Attending: PODIATRIST
Payer: COMMERCIAL

## 2022-06-03 DIAGNOSIS — Z98.890 STATUS POST FOOT SURGERY: ICD-10-CM

## 2022-06-03 DIAGNOSIS — M67.88 ACHILLES TENDINOSIS OF RIGHT LOWER EXTREMITY: ICD-10-CM

## 2022-06-03 PROCEDURE — 97110 THERAPEUTIC EXERCISES: CPT

## 2022-06-03 PROCEDURE — 97140 MANUAL THERAPY 1/> REGIONS: CPT

## 2022-06-06 ENCOUNTER — OFFICE VISIT (OUTPATIENT)
Dept: PHYSICAL THERAPY | Age: 62
End: 2022-06-06
Attending: PODIATRIST
Payer: COMMERCIAL

## 2022-06-06 DIAGNOSIS — M67.88 ACHILLES TENDINOSIS OF RIGHT LOWER EXTREMITY: ICD-10-CM

## 2022-06-06 DIAGNOSIS — Z98.890 STATUS POST FOOT SURGERY: ICD-10-CM

## 2022-06-06 PROCEDURE — 97140 MANUAL THERAPY 1/> REGIONS: CPT

## 2022-06-06 PROCEDURE — 97110 THERAPEUTIC EXERCISES: CPT

## 2022-06-08 ENCOUNTER — OFFICE VISIT (OUTPATIENT)
Dept: PHYSICAL THERAPY | Age: 62
End: 2022-06-08
Attending: PODIATRIST
Payer: COMMERCIAL

## 2022-06-08 PROCEDURE — 97140 MANUAL THERAPY 1/> REGIONS: CPT

## 2022-06-08 PROCEDURE — 97112 NEUROMUSCULAR REEDUCATION: CPT

## 2022-06-08 PROCEDURE — 97110 THERAPEUTIC EXERCISES: CPT

## 2022-06-10 ENCOUNTER — APPOINTMENT (OUTPATIENT)
Dept: PHYSICAL THERAPY | Age: 62
End: 2022-06-10
Attending: PODIATRIST
Payer: COMMERCIAL

## 2022-06-13 ENCOUNTER — APPOINTMENT (OUTPATIENT)
Dept: PHYSICAL THERAPY | Age: 62
End: 2022-06-13
Attending: PODIATRIST
Payer: COMMERCIAL

## 2022-06-13 ENCOUNTER — TELEPHONE (OUTPATIENT)
Dept: PHYSICAL THERAPY | Age: 62
End: 2022-06-13

## 2022-06-15 ENCOUNTER — OFFICE VISIT (OUTPATIENT)
Dept: FAMILY MEDICINE CLINIC | Facility: CLINIC | Age: 62
End: 2022-06-15
Payer: COMMERCIAL

## 2022-06-15 VITALS
HEIGHT: 59 IN | WEIGHT: 167 LBS | BODY MASS INDEX: 33.67 KG/M2 | HEART RATE: 80 BPM | DIASTOLIC BLOOD PRESSURE: 83 MMHG | SYSTOLIC BLOOD PRESSURE: 144 MMHG

## 2022-06-15 DIAGNOSIS — J18.9 PNEUMONIA OF LEFT LOWER LOBE DUE TO INFECTIOUS ORGANISM: Primary | ICD-10-CM

## 2022-06-15 PROCEDURE — 3008F BODY MASS INDEX DOCD: CPT | Performed by: FAMILY MEDICINE

## 2022-06-15 PROCEDURE — 3077F SYST BP >= 140 MM HG: CPT | Performed by: FAMILY MEDICINE

## 2022-06-15 PROCEDURE — 3079F DIAST BP 80-89 MM HG: CPT | Performed by: FAMILY MEDICINE

## 2022-06-15 PROCEDURE — 99213 OFFICE O/P EST LOW 20 MIN: CPT | Performed by: FAMILY MEDICINE

## 2022-06-15 RX ORDER — AZITHROMYCIN 250 MG/1
TABLET, FILM COATED ORAL SEE ADMIN INSTRUCTIONS
COMMUNITY
Start: 2022-06-10

## 2022-06-15 RX ORDER — PREDNISONE 20 MG/1
40 TABLET ORAL DAILY
COMMUNITY
Start: 2022-06-10

## 2022-06-15 RX ORDER — BENZONATATE 200 MG/1
CAPSULE ORAL
COMMUNITY
Start: 2022-06-10

## 2022-06-15 RX ORDER — AZITHROMYCIN 250 MG/1
TABLET, FILM COATED ORAL
Qty: 6 TABLET | Refills: 0 | Status: SHIPPED | OUTPATIENT
Start: 2022-06-15

## 2022-06-17 ENCOUNTER — OFFICE VISIT (OUTPATIENT)
Dept: PHYSICAL THERAPY | Age: 62
End: 2022-06-17
Attending: PODIATRIST
Payer: COMMERCIAL

## 2022-06-17 DIAGNOSIS — M67.88 ACHILLES TENDINOSIS OF RIGHT LOWER EXTREMITY: ICD-10-CM

## 2022-06-17 DIAGNOSIS — Z98.890 STATUS POST FOOT SURGERY: ICD-10-CM

## 2022-06-17 PROCEDURE — 97140 MANUAL THERAPY 1/> REGIONS: CPT

## 2022-06-17 PROCEDURE — 97110 THERAPEUTIC EXERCISES: CPT

## 2022-06-20 ENCOUNTER — APPOINTMENT (OUTPATIENT)
Dept: PHYSICAL THERAPY | Age: 62
End: 2022-06-20
Attending: PODIATRIST
Payer: COMMERCIAL

## 2022-06-23 ENCOUNTER — OFFICE VISIT (OUTPATIENT)
Dept: FAMILY MEDICINE CLINIC | Facility: CLINIC | Age: 62
End: 2022-06-23
Payer: COMMERCIAL

## 2022-06-23 VITALS
SYSTOLIC BLOOD PRESSURE: 111 MMHG | HEART RATE: 72 BPM | BODY MASS INDEX: 33.47 KG/M2 | HEIGHT: 59 IN | DIASTOLIC BLOOD PRESSURE: 77 MMHG | WEIGHT: 166 LBS

## 2022-06-23 DIAGNOSIS — R09.82 POSTNASAL DRIP: ICD-10-CM

## 2022-06-23 DIAGNOSIS — H66.003 NON-RECURRENT ACUTE SUPPURATIVE OTITIS MEDIA OF BOTH EARS WITHOUT SPONTANEOUS RUPTURE OF TYMPANIC MEMBRANES: Primary | ICD-10-CM

## 2022-06-23 PROCEDURE — 3074F SYST BP LT 130 MM HG: CPT | Performed by: FAMILY MEDICINE

## 2022-06-23 PROCEDURE — 3008F BODY MASS INDEX DOCD: CPT | Performed by: FAMILY MEDICINE

## 2022-06-23 PROCEDURE — 3078F DIAST BP <80 MM HG: CPT | Performed by: FAMILY MEDICINE

## 2022-06-23 PROCEDURE — 99213 OFFICE O/P EST LOW 20 MIN: CPT | Performed by: FAMILY MEDICINE

## 2022-06-23 RX ORDER — LEVOFLOXACIN 500 MG/1
500 TABLET, FILM COATED ORAL DAILY
Qty: 10 TABLET | Refills: 0 | Status: SHIPPED | OUTPATIENT
Start: 2022-06-23 | End: 2022-07-03

## 2022-06-23 RX ORDER — LORATADINE AND PSEUDOEPHEDRINE SULFATE 5; 120 MG/1; MG/1
1 TABLET, EXTENDED RELEASE ORAL EVERY MORNING
Qty: 20 TABLET | Refills: 0 | Status: SHIPPED | OUTPATIENT
Start: 2022-06-23

## 2022-06-23 RX ORDER — FLUTICASONE PROPIONATE 50 MCG
2 SPRAY, SUSPENSION (ML) NASAL DAILY
Qty: 1 EACH | Refills: 1 | Status: SHIPPED | OUTPATIENT
Start: 2022-06-23

## 2022-06-24 ENCOUNTER — OFFICE VISIT (OUTPATIENT)
Dept: PHYSICAL THERAPY | Age: 62
End: 2022-06-24
Attending: PODIATRIST
Payer: COMMERCIAL

## 2022-06-24 DIAGNOSIS — M67.88 ACHILLES TENDINOSIS OF RIGHT LOWER EXTREMITY: ICD-10-CM

## 2022-06-24 DIAGNOSIS — Z98.890 STATUS POST FOOT SURGERY: ICD-10-CM

## 2022-06-24 PROCEDURE — 97140 MANUAL THERAPY 1/> REGIONS: CPT

## 2022-06-24 PROCEDURE — 97112 NEUROMUSCULAR REEDUCATION: CPT

## 2022-06-24 PROCEDURE — 97110 THERAPEUTIC EXERCISES: CPT

## 2022-06-27 ENCOUNTER — OFFICE VISIT (OUTPATIENT)
Dept: PODIATRY CLINIC | Facility: CLINIC | Age: 62
End: 2022-06-27
Payer: COMMERCIAL

## 2022-06-27 DIAGNOSIS — M67.88 ACHILLES TENDINOSIS OF RIGHT LOWER EXTREMITY: Primary | ICD-10-CM

## 2022-06-27 DIAGNOSIS — Z98.890 STATUS POST FOOT SURGERY: ICD-10-CM

## 2022-07-01 ENCOUNTER — OFFICE VISIT (OUTPATIENT)
Dept: PHYSICAL THERAPY | Age: 62
End: 2022-07-01
Attending: PODIATRIST
Payer: COMMERCIAL

## 2022-07-01 PROCEDURE — 97110 THERAPEUTIC EXERCISES: CPT

## 2022-07-01 PROCEDURE — 97140 MANUAL THERAPY 1/> REGIONS: CPT

## 2022-07-05 ENCOUNTER — OFFICE VISIT (OUTPATIENT)
Dept: PHYSICAL THERAPY | Age: 62
End: 2022-07-05
Attending: PODIATRIST
Payer: COMMERCIAL

## 2022-07-05 PROCEDURE — 97140 MANUAL THERAPY 1/> REGIONS: CPT

## 2022-07-05 PROCEDURE — 97112 NEUROMUSCULAR REEDUCATION: CPT

## 2022-07-05 PROCEDURE — 97110 THERAPEUTIC EXERCISES: CPT

## 2022-07-07 ENCOUNTER — OFFICE VISIT (OUTPATIENT)
Dept: FAMILY MEDICINE CLINIC | Facility: CLINIC | Age: 62
End: 2022-07-07
Payer: COMMERCIAL

## 2022-07-07 VITALS
BODY MASS INDEX: 33.47 KG/M2 | DIASTOLIC BLOOD PRESSURE: 82 MMHG | HEART RATE: 86 BPM | HEIGHT: 59 IN | SYSTOLIC BLOOD PRESSURE: 133 MMHG | WEIGHT: 166 LBS

## 2022-07-07 DIAGNOSIS — H66.003 NON-RECURRENT ACUTE SUPPURATIVE OTITIS MEDIA OF BOTH EARS WITHOUT SPONTANEOUS RUPTURE OF TYMPANIC MEMBRANES: Primary | ICD-10-CM

## 2022-07-07 DIAGNOSIS — R09.82 POSTNASAL DRIP: ICD-10-CM

## 2022-07-07 PROCEDURE — 99213 OFFICE O/P EST LOW 20 MIN: CPT | Performed by: FAMILY MEDICINE

## 2022-07-07 PROCEDURE — 3075F SYST BP GE 130 - 139MM HG: CPT | Performed by: FAMILY MEDICINE

## 2022-07-07 PROCEDURE — 3079F DIAST BP 80-89 MM HG: CPT | Performed by: FAMILY MEDICINE

## 2022-07-07 PROCEDURE — 3008F BODY MASS INDEX DOCD: CPT | Performed by: FAMILY MEDICINE

## 2022-07-07 RX ORDER — LORATADINE AND PSEUDOEPHEDRINE SULFATE 5; 120 MG/1; MG/1
1 TABLET, EXTENDED RELEASE ORAL EVERY MORNING
Qty: 20 TABLET | Refills: 0 | Status: SHIPPED | OUTPATIENT
Start: 2022-07-07

## 2022-07-07 RX ORDER — CHLORHEXIDINE/GLYCERIN/HE-CELL
JELLY (GRAM) TOPICAL
Qty: 60 G | Refills: 3 | Status: SHIPPED | OUTPATIENT
Start: 2022-07-07

## 2022-07-08 ENCOUNTER — APPOINTMENT (OUTPATIENT)
Dept: PHYSICAL THERAPY | Age: 62
End: 2022-07-08
Attending: PODIATRIST
Payer: COMMERCIAL

## 2022-07-09 RX ORDER — ERGOCALCIFEROL 1.25 MG/1
CAPSULE ORAL
Qty: 12 CAPSULE | Refills: 3 | OUTPATIENT
Start: 2022-07-09

## 2022-07-12 ENCOUNTER — OFFICE VISIT (OUTPATIENT)
Dept: PHYSICAL THERAPY | Age: 62
End: 2022-07-12
Attending: PODIATRIST
Payer: COMMERCIAL

## 2022-07-12 PROCEDURE — 97112 NEUROMUSCULAR REEDUCATION: CPT

## 2022-07-12 PROCEDURE — 97110 THERAPEUTIC EXERCISES: CPT

## 2022-07-12 PROCEDURE — 97140 MANUAL THERAPY 1/> REGIONS: CPT

## 2022-07-15 ENCOUNTER — OFFICE VISIT (OUTPATIENT)
Dept: PHYSICAL THERAPY | Age: 62
End: 2022-07-15
Attending: PODIATRIST
Payer: COMMERCIAL

## 2022-07-15 PROCEDURE — 97140 MANUAL THERAPY 1/> REGIONS: CPT

## 2022-07-15 PROCEDURE — 97112 NEUROMUSCULAR REEDUCATION: CPT

## 2022-07-15 PROCEDURE — 97110 THERAPEUTIC EXERCISES: CPT

## 2022-07-19 ENCOUNTER — TELEPHONE (OUTPATIENT)
Dept: PHYSICAL THERAPY | Facility: HOSPITAL | Age: 62
End: 2022-07-19

## 2022-07-19 ENCOUNTER — APPOINTMENT (OUTPATIENT)
Dept: PHYSICAL THERAPY | Age: 62
End: 2022-07-19
Attending: PODIATRIST
Payer: COMMERCIAL

## 2022-07-21 ENCOUNTER — HOSPITAL ENCOUNTER (OUTPATIENT)
Age: 62
Discharge: HOME | End: 2022-07-21
Payer: COMMERCIAL

## 2022-07-21 VITALS
HEIGHT: 59 IN | TEMPERATURE: 97 F | HEART RATE: 84 BPM | RESPIRATION RATE: 20 BRPM | SYSTOLIC BLOOD PRESSURE: 165 MMHG | BODY MASS INDEX: 33.26 KG/M2 | OXYGEN SATURATION: 98 % | DIASTOLIC BLOOD PRESSURE: 71 MMHG | WEIGHT: 165 LBS

## 2022-07-21 DIAGNOSIS — R05.3 CHRONIC COUGH: ICD-10-CM

## 2022-07-21 DIAGNOSIS — H66.005 RECURRENT ACUTE SUPPURATIVE OTITIS MEDIA WITHOUT SPONTANEOUS RUPTURE OF LEFT TYMPANIC MEMBRANE: Primary | ICD-10-CM

## 2022-07-21 PROCEDURE — 99213 OFFICE O/P EST LOW 20 MIN: CPT | Performed by: NURSE PRACTITIONER

## 2022-07-21 RX ORDER — BENZONATATE 100 MG/1
100 CAPSULE ORAL 3 TIMES DAILY PRN
Qty: 10 CAPSULE | Refills: 0 | Status: SHIPPED | OUTPATIENT
Start: 2022-07-21

## 2022-07-21 RX ORDER — AMOXICILLIN AND CLAVULANATE POTASSIUM 875; 125 MG/1; MG/1
1 TABLET, FILM COATED ORAL 2 TIMES DAILY
Qty: 20 TABLET | Refills: 0 | Status: SHIPPED | OUTPATIENT
Start: 2022-07-21 | End: 2022-07-31

## 2022-07-21 NOTE — ED INITIAL ASSESSMENT (HPI)
Pt presents with left ear pain Tuesday 7/19/22, heard a \"Pop\" and then \"liquid came out\". Right eye with \"a little pain\" as well. Pt reports left eye redness with clear drainage x 3-4 days. Pt reports cough, congestion and headache x 8 days.

## 2022-07-21 NOTE — ED QUICK NOTES
Pt had Covid PCR & Rapid Strep Tues 7/19/22 - both negative    Seen at [facility] and treated for the issures previously stated.

## 2022-07-21 NOTE — Clinical Note
Continue to use the eyedrops. Start the antibiotics. Use the cough medicine as needed.   Follow-up with an ear specialist

## 2022-07-22 ENCOUNTER — OFFICE VISIT (OUTPATIENT)
Dept: PHYSICAL THERAPY | Age: 62
End: 2022-07-22
Attending: PODIATRIST
Payer: COMMERCIAL

## 2022-07-22 PROCEDURE — 97110 THERAPEUTIC EXERCISES: CPT

## 2022-07-22 PROCEDURE — 97112 NEUROMUSCULAR REEDUCATION: CPT

## 2022-07-22 PROCEDURE — 97140 MANUAL THERAPY 1/> REGIONS: CPT

## 2022-07-27 ENCOUNTER — OFFICE VISIT (OUTPATIENT)
Dept: PODIATRY CLINIC | Facility: CLINIC | Age: 62
End: 2022-07-27
Payer: COMMERCIAL

## 2022-07-27 DIAGNOSIS — M67.88 ACHILLES TENDINOSIS OF RIGHT LOWER EXTREMITY: Primary | ICD-10-CM

## 2022-07-27 DIAGNOSIS — S86.011A PARTIAL TEAR OF RIGHT ACHILLES TENDON, INITIAL ENCOUNTER: ICD-10-CM

## 2022-07-27 PROCEDURE — 99213 OFFICE O/P EST LOW 20 MIN: CPT | Performed by: PODIATRIST

## 2022-08-02 ENCOUNTER — TELEPHONE (OUTPATIENT)
Dept: PHYSICAL THERAPY | Facility: HOSPITAL | Age: 62
End: 2022-08-02

## 2022-08-02 ENCOUNTER — APPOINTMENT (OUTPATIENT)
Dept: PHYSICAL THERAPY | Age: 62
End: 2022-08-02
Payer: COMMERCIAL

## 2022-08-10 ENCOUNTER — TELEPHONE (OUTPATIENT)
Dept: PHYSICAL THERAPY | Facility: HOSPITAL | Age: 62
End: 2022-08-10

## 2022-08-12 ENCOUNTER — APPOINTMENT (OUTPATIENT)
Dept: PHYSICAL THERAPY | Age: 62
End: 2022-08-12
Attending: PODIATRIST
Payer: COMMERCIAL

## 2022-08-17 ENCOUNTER — TELEPHONE (OUTPATIENT)
Dept: PHYSICAL THERAPY | Facility: HOSPITAL | Age: 62
End: 2022-08-17

## 2022-08-17 ENCOUNTER — TELEPHONE (OUTPATIENT)
Dept: ADMINISTRATIVE | Age: 62
End: 2022-08-17

## 2022-08-19 ENCOUNTER — APPOINTMENT (OUTPATIENT)
Dept: PHYSICAL THERAPY | Age: 62
End: 2022-08-19
Attending: PODIATRIST
Payer: COMMERCIAL

## 2022-08-19 ENCOUNTER — TELEPHONE (OUTPATIENT)
Dept: PHYSICAL THERAPY | Facility: HOSPITAL | Age: 62
End: 2022-08-19

## 2022-08-26 ENCOUNTER — APPOINTMENT (OUTPATIENT)
Dept: PHYSICAL THERAPY | Age: 62
End: 2022-08-26
Attending: PODIATRIST
Payer: COMMERCIAL

## 2022-08-29 ENCOUNTER — OFFICE VISIT (OUTPATIENT)
Dept: PODIATRY CLINIC | Facility: CLINIC | Age: 62
End: 2022-08-29
Payer: COMMERCIAL

## 2022-08-29 DIAGNOSIS — S86.011A PARTIAL TEAR OF RIGHT ACHILLES TENDON, INITIAL ENCOUNTER: ICD-10-CM

## 2022-08-29 DIAGNOSIS — Z98.890 STATUS POST FOOT SURGERY: Primary | ICD-10-CM

## 2022-08-29 DIAGNOSIS — M67.88 ACHILLES TENDINOSIS OF RIGHT LOWER EXTREMITY: ICD-10-CM

## 2022-08-29 PROCEDURE — 99213 OFFICE O/P EST LOW 20 MIN: CPT | Performed by: PODIATRIST

## 2022-09-02 ENCOUNTER — APPOINTMENT (OUTPATIENT)
Dept: PHYSICAL THERAPY | Age: 62
End: 2022-09-02
Attending: PODIATRIST
Payer: COMMERCIAL

## 2022-09-14 ENCOUNTER — OFFICE VISIT (OUTPATIENT)
Dept: FAMILY MEDICINE CLINIC | Facility: CLINIC | Age: 62
End: 2022-09-14
Payer: COMMERCIAL

## 2022-09-14 VITALS
HEART RATE: 66 BPM | BODY MASS INDEX: 34.8 KG/M2 | SYSTOLIC BLOOD PRESSURE: 139 MMHG | DIASTOLIC BLOOD PRESSURE: 77 MMHG | WEIGHT: 172.63 LBS | HEIGHT: 59 IN

## 2022-09-14 DIAGNOSIS — R05.8 POST-VIRAL COUGH SYNDROME: ICD-10-CM

## 2022-09-14 DIAGNOSIS — B34.9 ACUTE VIRAL SYNDROME: Primary | ICD-10-CM

## 2022-09-14 DIAGNOSIS — I10 ESSENTIAL HYPERTENSION: ICD-10-CM

## 2022-09-14 PROCEDURE — 3008F BODY MASS INDEX DOCD: CPT | Performed by: FAMILY MEDICINE

## 2022-09-14 PROCEDURE — 3078F DIAST BP <80 MM HG: CPT | Performed by: FAMILY MEDICINE

## 2022-09-14 PROCEDURE — 3075F SYST BP GE 130 - 139MM HG: CPT | Performed by: FAMILY MEDICINE

## 2022-09-14 PROCEDURE — 99213 OFFICE O/P EST LOW 20 MIN: CPT | Performed by: FAMILY MEDICINE

## 2022-09-14 RX ORDER — LISINOPRIL 2.5 MG/1
2.5 TABLET ORAL DAILY
Qty: 90 TABLET | Refills: 1 | Status: SHIPPED | OUTPATIENT
Start: 2022-09-14

## 2022-09-14 RX ORDER — BENZONATATE 100 MG/1
100 CAPSULE ORAL 3 TIMES DAILY PRN
Qty: 30 CAPSULE | Refills: 0 | Status: SHIPPED | OUTPATIENT
Start: 2022-09-14

## 2022-09-15 NOTE — TELEPHONE ENCOUNTER
Forms received and logged for processing.  [facility] message sent regarding HIPAA
Spoke to pt who is still in need of forms to be completed. Explained we need a HIPAA signed off so that we are able to release info.  Pt verbalized understanding, states will be going to Dr. Sofía Gutierrez office to sign HIPAA
8

## 2022-09-26 ENCOUNTER — TELEPHONE (OUTPATIENT)
Dept: PODIATRY CLINIC | Facility: CLINIC | Age: 62
End: 2022-09-26

## 2022-09-26 NOTE — TELEPHONE ENCOUNTER
Per pt her employer lost the fmla form that she was given last week and asking to fax a copy to her employer.  Please advise    Fax: [phone] attn: Jj Giron

## 2022-10-06 ENCOUNTER — OFFICE VISIT (OUTPATIENT)
Dept: PODIATRY CLINIC | Facility: CLINIC | Age: 62
End: 2022-10-06
Payer: COMMERCIAL

## 2022-10-06 DIAGNOSIS — M67.88 ACHILLES TENDINOSIS OF RIGHT LOWER EXTREMITY: Primary | ICD-10-CM

## 2022-10-06 PROCEDURE — 99213 OFFICE O/P EST LOW 20 MIN: CPT | Performed by: PODIATRIST

## 2022-10-14 ENCOUNTER — OFFICE VISIT (OUTPATIENT)
Dept: FAMILY MEDICINE CLINIC | Facility: CLINIC | Age: 62
End: 2022-10-14
Payer: COMMERCIAL

## 2022-10-14 VITALS
DIASTOLIC BLOOD PRESSURE: 76 MMHG | WEIGHT: 172 LBS | HEART RATE: 73 BPM | HEIGHT: 59 IN | BODY MASS INDEX: 34.68 KG/M2 | SYSTOLIC BLOOD PRESSURE: 136 MMHG

## 2022-10-14 DIAGNOSIS — Z00.00 PHYSICAL EXAM: Primary | ICD-10-CM

## 2022-10-14 PROCEDURE — 90472 IMMUNIZATION ADMIN EACH ADD: CPT | Performed by: FAMILY MEDICINE

## 2022-10-14 PROCEDURE — 90750 HZV VACC RECOMBINANT IM: CPT | Performed by: FAMILY MEDICINE

## 2022-10-14 PROCEDURE — 3075F SYST BP GE 130 - 139MM HG: CPT | Performed by: FAMILY MEDICINE

## 2022-10-14 PROCEDURE — 99396 PREV VISIT EST AGE 40-64: CPT | Performed by: FAMILY MEDICINE

## 2022-10-14 PROCEDURE — 93000 ELECTROCARDIOGRAM COMPLETE: CPT | Performed by: FAMILY MEDICINE

## 2022-10-14 PROCEDURE — 90686 IIV4 VACC NO PRSV 0.5 ML IM: CPT | Performed by: FAMILY MEDICINE

## 2022-10-14 PROCEDURE — 90471 IMMUNIZATION ADMIN: CPT | Performed by: FAMILY MEDICINE

## 2022-10-14 PROCEDURE — 3008F BODY MASS INDEX DOCD: CPT | Performed by: FAMILY MEDICINE

## 2022-10-14 PROCEDURE — 3078F DIAST BP <80 MM HG: CPT | Performed by: FAMILY MEDICINE

## 2022-10-15 ENCOUNTER — LAB ENCOUNTER (OUTPATIENT)
Dept: LAB | Age: 62
End: 2022-10-15
Attending: FAMILY MEDICINE
Payer: COMMERCIAL

## 2022-10-15 ENCOUNTER — EKG ENCOUNTER (OUTPATIENT)
Dept: LAB | Age: 62
End: 2022-10-15
Attending: FAMILY MEDICINE
Payer: COMMERCIAL

## 2022-10-15 DIAGNOSIS — Z00.00 PHYSICAL EXAM: ICD-10-CM

## 2022-10-15 DIAGNOSIS — Z00.00 ROUTINE GENERAL MEDICAL EXAMINATION AT A HEALTH CARE FACILITY: Primary | ICD-10-CM

## 2022-10-15 LAB
ALBUMIN SERPL-MCNC: 3.4 G/DL (ref 3.4–5)
ALBUMIN/GLOB SERPL: 0.9 {RATIO} (ref 1–2)
ALP LIVER SERPL-CCNC: 95 U/L
ALT SERPL-CCNC: 58 U/L
ANION GAP SERPL CALC-SCNC: 6 MMOL/L (ref 0–18)
AST SERPL-CCNC: 23 U/L (ref 15–37)
BASOPHILS # BLD AUTO: 0.03 X10(3) UL (ref 0–0.2)
BASOPHILS NFR BLD AUTO: 0.4 %
BILIRUB SERPL-MCNC: 0.4 MG/DL (ref 0.1–2)
BILIRUB UR QL STRIP.AUTO: NEGATIVE
BILIRUB UR QL STRIP.AUTO: NEGATIVE
BUN BLD-MCNC: 21 MG/DL (ref 7–18)
CALCIUM BLD-MCNC: 9.5 MG/DL (ref 8.5–10.1)
CANCER AG125 SERPL-ACNC: 7.9 U/ML (ref ?–35)
CHLORIDE SERPL-SCNC: 107 MMOL/L (ref 98–112)
CHOLEST SERPL-MCNC: 171 MG/DL (ref ?–200)
CLARITY UR REFRACT.AUTO: CLEAR
CLARITY UR REFRACT.AUTO: CLEAR
CO2 SERPL-SCNC: 28 MMOL/L (ref 21–32)
COLOR UR AUTO: YELLOW
CREAT BLD-MCNC: 0.69 MG/DL
EOSINOPHIL # BLD AUTO: 0.19 X10(3) UL (ref 0–0.7)
EOSINOPHIL NFR BLD AUTO: 2.8 %
ERYTHROCYTE [DISTWIDTH] IN BLOOD BY AUTOMATED COUNT: 14.6 %
EST. AVERAGE GLUCOSE BLD GHB EST-MCNC: 151 MG/DL (ref 68–126)
FASTING PATIENT LIPID ANSWER: YES
FASTING STATUS PATIENT QL REPORTED: YES
GFR SERPLBLD BASED ON 1.73 SQ M-ARVRAT: 99 ML/MIN/1.73M2 (ref 60–?)
GLOBULIN PLAS-MCNC: 4 G/DL (ref 2.8–4.4)
GLUCOSE BLD-MCNC: 86 MG/DL (ref 70–99)
GLUCOSE UR STRIP.AUTO-MCNC: NEGATIVE MG/DL
GLUCOSE UR STRIP.AUTO-MCNC: NEGATIVE MG/DL
HBA1C MFR BLD: 6.9 % (ref ?–5.7)
HCT VFR BLD AUTO: 42.4 %
HDLC SERPL-MCNC: 42 MG/DL (ref 40–59)
HGB BLD-MCNC: 14 G/DL
IMM GRANULOCYTES # BLD AUTO: 0.01 X10(3) UL (ref 0–1)
IMM GRANULOCYTES NFR BLD: 0.1 %
KETONES UR STRIP.AUTO-MCNC: NEGATIVE MG/DL
KETONES UR STRIP.AUTO-MCNC: NEGATIVE MG/DL
LDLC SERPL CALC-MCNC: 111 MG/DL (ref ?–100)
LEUKOCYTE ESTERASE UR QL STRIP.AUTO: NEGATIVE
LEUKOCYTE ESTERASE UR QL STRIP.AUTO: NEGATIVE
LYMPHOCYTES # BLD AUTO: 1.35 X10(3) UL (ref 1–4)
LYMPHOCYTES NFR BLD AUTO: 20.2 %
MCH RBC QN AUTO: 30.4 PG (ref 26–34)
MCHC RBC AUTO-ENTMCNC: 33 G/DL (ref 31–37)
MCV RBC AUTO: 92.2 FL
MONOCYTES # BLD AUTO: 0.43 X10(3) UL (ref 0.1–1)
MONOCYTES NFR BLD AUTO: 6.4 %
NEUTROPHILS # BLD AUTO: 4.67 X10 (3) UL (ref 1.5–7.7)
NEUTROPHILS # BLD AUTO: 4.67 X10(3) UL (ref 1.5–7.7)
NEUTROPHILS NFR BLD AUTO: 70.1 %
NITRITE UR QL STRIP.AUTO: NEGATIVE
NITRITE UR QL STRIP.AUTO: NEGATIVE
NONHDLC SERPL-MCNC: 129 MG/DL (ref ?–130)
OSMOLALITY SERPL CALC.SUM OF ELEC: 294 MOSM/KG (ref 275–295)
PH UR STRIP.AUTO: 6 [PH] (ref 5–8)
PH UR STRIP.AUTO: 6 [PH] (ref 5–8)
PLATELET # BLD AUTO: 256 10(3)UL (ref 150–450)
POTASSIUM SERPL-SCNC: 4.2 MMOL/L (ref 3.5–5.1)
PROT SERPL-MCNC: 7.4 G/DL (ref 6.4–8.2)
PROT UR STRIP.AUTO-MCNC: NEGATIVE MG/DL
PROT UR STRIP.AUTO-MCNC: NEGATIVE MG/DL
RBC # BLD AUTO: 4.6 X10(6)UL
SODIUM SERPL-SCNC: 141 MMOL/L (ref 136–145)
SP GR UR STRIP.AUTO: 1.01 (ref 1–1.03)
SP GR UR STRIP.AUTO: 1.01 (ref 1–1.03)
TRIGL SERPL-MCNC: 97 MG/DL (ref 30–149)
TSI SER-ACNC: 1.46 MIU/ML (ref 0.36–3.74)
UROBILINOGEN UR STRIP.AUTO-MCNC: <2 MG/DL
UROBILINOGEN UR STRIP.AUTO-MCNC: <2 MG/DL
VIT D+METAB SERPL-MCNC: 40 NG/ML (ref 30–100)
VLDLC SERPL CALC-MCNC: 17 MG/DL (ref 0–30)
WBC # BLD AUTO: 6.7 X10(3) UL (ref 4–11)

## 2022-10-15 PROCEDURE — 85025 COMPLETE CBC W/AUTO DIFF WBC: CPT | Performed by: FAMILY MEDICINE

## 2022-10-15 PROCEDURE — 83036 HEMOGLOBIN GLYCOSYLATED A1C: CPT | Performed by: FAMILY MEDICINE

## 2022-10-15 PROCEDURE — 81001 URINALYSIS AUTO W/SCOPE: CPT | Performed by: FAMILY MEDICINE

## 2022-10-15 PROCEDURE — 82306 VITAMIN D 25 HYDROXY: CPT

## 2022-10-15 PROCEDURE — 84443 ASSAY THYROID STIM HORMONE: CPT | Performed by: FAMILY MEDICINE

## 2022-10-15 PROCEDURE — 36415 COLL VENOUS BLD VENIPUNCTURE: CPT | Performed by: FAMILY MEDICINE

## 2022-10-15 PROCEDURE — 3044F HG A1C LEVEL LT 7.0%: CPT | Performed by: FAMILY MEDICINE

## 2022-10-15 PROCEDURE — 80053 COMPREHEN METABOLIC PANEL: CPT | Performed by: FAMILY MEDICINE

## 2022-10-15 PROCEDURE — 80061 LIPID PANEL: CPT | Performed by: FAMILY MEDICINE

## 2022-10-15 PROCEDURE — 86304 IMMUNOASSAY TUMOR CA 125: CPT | Performed by: FAMILY MEDICINE

## 2022-10-17 ENCOUNTER — TELEPHONE (OUTPATIENT)
Dept: FAMILY MEDICINE CLINIC | Facility: CLINIC | Age: 62
End: 2022-10-17

## 2022-10-17 LAB
C TRACH DNA SPEC QL NAA+PROBE: NEGATIVE
N GONORRHOEA DNA SPEC QL NAA+PROBE: NEGATIVE

## 2022-10-17 NOTE — TELEPHONE ENCOUNTER
Patient states Mammogram from 01/07/2019 mailed to [facility] she was told results needed to be mailed to hospital department in order to make appt. [address].  Radiology Imaging Department  [address] [place] [address]

## 2022-10-26 LAB — HPV I/H RISK 1 DNA SPEC QL NAA+PROBE: NEGATIVE

## 2022-11-01 NOTE — PROGRESS NOTES
11/12/2018  9:08 AM    Jillian Vargas is a 63year old female. Chief complaint(s): Patient presents with:  Routine Physical    HPI:     Jillian Vargas primary complaint is regarding CPE.      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  Patient reports that he has had epistaxis before but normally has not lasted longer than 15 20 minutes.  Reports that his last INR level was 3.2 approximately 2 weeks ago.  Reports that it is mostly coming out of the left nostril.  He denies feeling lightheaded or short of breath.  No syncope.  He denies any trauma to the nose.      No Known Allergies    Discharge Medication List as of 11/1/2022  1:02 PM      Prior to Admission Medications    Details   warfarin (COUMADIN) 7.5 MG tablet TAKE 1 AND 1/2 TABLETS BY MOUTH EVERY TUESDAY AND THURSDAY, AND TAKE 1 TABLET THE OTHER 5 DAYS OF THE WEEK, OR AS DIRECTEDEprescribe, Disp-40 tablet,R-3      allopurinol (ZYLOPRIM) 300 MG tablet Take 1 tablet by mouth daily. TAKE 1 TABLET BY MOUTH DAILY.Eprescribe, Disp-30 tablet,R-3         New Prescriptions    Details   amoxicillin-clavulanate (Augmentin) 875-125 MG per tablet Take 1 tablet by mouth every 12 hours for 3 days.Normal, Disp-6 tablet, R-0             Past Medical History:   Diagnosis Date   • Blood clot associated with vein wall inflammation     Left leg - PE   • Family history of diabetes mellitus 10/10/2006   • Noncompliance with medication regimen    • Overweight(278.02) 10/10/2006   • Personal history of traumatic fracture     Left Wrist   • PMH of     Spermatocele   • PMH of 11/12    pulmonary emboli        Past Surgical History:   Procedure Laterality Date   • PAST SURGICAL HISTORY      None       Family History   Problem Relation Age of Onset   • Other Mother         Healthy   • Alcohol Abuse Father         Also tobacco abuse   • Other  Brother         6 brothers-healthy   • Other Sister         2 sisters-healthy   • Other Son         2 sons-healthy       Social History     Tobacco Use   • Smoking status: Never Smoker   • Smokeless tobacco: Never Used   Substance Use Topics   • Alcohol use: Yes   • Drug use: No       E-cigarette/Vaping     E-Cigarette/Vaping Substances & Devices       Review of Systems   Constitutional: Negative for appetite change, chills, fatigue and fever.   HENT: Positive for nosebleeds. Negative for congestion, ear pain, rhinorrhea, sinus pain, sore throat and tinnitus.    Eyes: Negative for photophobia and pain.   Respiratory: Negative for cough, chest tightness, shortness of breath and wheezing.    Cardiovascular: Negative for chest pain, palpitations and leg swelling.   Gastrointestinal: Negative for abdominal pain, constipation, diarrhea, nausea and vomiting.   Musculoskeletal: Negative for back pain, joint swelling, myalgias and neck stiffness.   Skin: Negative for color change and rash.   Neurological: Negative for dizziness, seizures, weakness, light-headedness and headaches.   Psychiatric/Behavioral: Negative for agitation, confusion, hallucinations, self-injury and suicidal ideas.       Physical Exam     ED Triage Vitals   ED Triage Vitals Group      Temp 11/01/22 1039 98.1 °F (36.7 °C)      Heart Rate 11/01/22 1037 82      Resp 11/01/22 1039 18      BP 11/01/22 1039 (!) 149/83      SpO2 11/01/22 1037 99 %      EtCO2 mmHg --       Height 11/01/22 1037 5' 9\" (1.753 m)      Weight 11/01/22 1037 224 lb (101.6 kg)      Weight Scale Used 11/01/22 1037 ED Stated      BMI (Calculated) 11/01/22 1037 33.08      IBW/kg (Calculated) 11/01/22 1037 70.7       Physical Exam  Vitals and nursing note reviewed.   Constitutional:       General: He is not in acute distress.     Appearance: He is well-developed. He is not diaphoretic.   HENT:      Head: Normocephalic and atraumatic.      Right Ear: Tympanic membrane and external ear  RIGHT     • BREAST BIOPSY     •       x2   • CARPAL TUNNEL RELEASE     • CATARACT     • CORRECT BUNION,OTHR METHODS     • EXCISION LESION HEAD/NECK/FACE Left 10/11/2017    Performed by Sarina Velasquez MD at [facility]   • EYE SURGERY     • FOOT S MG Oral Tab Take 1 tablet (15 mg total) by mouth daily.  Disp: 30 tablet Rfl: 3   MOMETASONE FUROATE 0.1 % External Cream APPLY EXTERNALLY TO THE AFFECTED AREA TWICE DAILY Disp: 30 g Rfl: 0       Allergies:    Meperidine              UNKNOWN    Comment:BP alcala normal.      Left Ear: Tympanic membrane and external ear normal.      Nose:      Right Nostril: No epistaxis, septal hematoma or occlusion.      Left Nostril: Epistaxis present. No septal hematoma or occlusion.      Right Sinus: No maxillary sinus tenderness or frontal sinus tenderness.      Left Sinus: No maxillary sinus tenderness or frontal sinus tenderness.      Comments: There is bright red blood in the left nares. Originally appreciated bleeding from medial inferior aspect of the nose.      Mouth/Throat:      Mouth: Mucous membranes are moist.      Pharynx: No pharyngeal swelling, oropharyngeal exudate or posterior oropharyngeal erythema.      Comments: Blood noted dripping down the posterior left aspect of the throat.   Eyes:      General:         Right eye: No discharge.         Left eye: No discharge.      Extraocular Movements: Extraocular movements intact.      Conjunctiva/sclera: Conjunctivae normal.      Pupils: Pupils are equal, round, and reactive to light.   Neck:      Thyroid: No thyromegaly.      Trachea: No tracheal deviation.   Cardiovascular:      Rate and Rhythm: Normal rate and regular rhythm.      Heart sounds: Normal heart sounds. No murmur heard.    No friction rub.   Pulmonary:      Effort: Pulmonary effort is normal. No respiratory distress.   Abdominal:      General: Abdomen is flat.   Musculoskeletal:         General: Normal range of motion.      Cervical back: Normal range of motion and neck supple.   Lymphadenopathy:      Cervical: No cervical adenopathy.   Skin:     General: Skin is warm and dry.      Findings: No erythema or rash.   Neurological:      General: No focal deficit present.      Mental Status: He is alert and oriented to person, place, and time. Mental status is at baseline.      GCS: GCS eye subscore is 4. GCS verbal subscore is 5. GCS motor subscore is 6.      Cranial Nerves: No cranial nerve deficit.      Coordination: Coordination normal.   Psychiatric:         Mood  and Affect: Mood normal.         Behavior: Behavior normal.         Thought Content: Thought content normal.         Judgment: Judgment normal.         ED Course     Epistaxis Management    Date/Time: 11/1/2022 12:31 PM  Performed by: Rylee Martines PA-C  Authorized by: Rylee Martines PA-C     Consent:     Consent obtained:  Verbal    Consent given by:  Patient    Risks discussed:  Bleeding, nasal injury, pain and infection    Alternatives discussed:  Alternative treatment  Universal protocol:     Patient identity confirmed:  Verbally with patient  Anesthesia:     Anesthesia method:  None  Procedure details:     Treatment site:  L anterior    Treatment method:  Silver nitrate and anterior pack (Afrin and nasal clamp)  Post-procedure details:     Assessment:  Bleeding stopped    Procedure completion:  Tolerated        Lab Results     Results for orders placed or performed during the hospital encounter of 11/01/22   Prothrombin Time   Result Value Ref Range    Prothrombin Time 29.3 (H) 9.7 - 11.8 sec    INR 2.9     CBC with Automated Differential (performable only)   Result Value Ref Range    WBC 7.9 4.2 - 11.0 K/mcL    RBC 4.73 4.50 - 5.90 mil/mcL    HGB 13.8 13.0 - 17.0 g/dL    HCT 42.4 39.0 - 51.0 %    MCV 89.6 78.0 - 100.0 fl    MCH 29.2 26.0 - 34.0 pg    MCHC 32.5 32.0 - 36.5 g/dL    RDW-CV 12.8 11.0 - 15.0 %    RDW-SD 42.2 39.0 - 50.0 fL     140 - 450 K/mcL    NRBC 0 <=0 /100 WBC    Neutrophil, Percent 67 %    Lymphocytes, Percent 25 %    Mono, Percent 6 %    Eosinophils, Percent 2 %    Basophils, Percent 0 %    Immature Granulocytes 0 %    Absolute Neutrophils 5.3 1.8 - 7.7 K/mcL    Absolute Lymphocytes 2.0 1.0 - 4.8 K/mcL    Absolute Monocytes 0.5 0.3 - 0.9 K/mcL    Absolute Eosinophils  0.1 0.0 - 0.5 K/mcL    Absolute Basophils 0.0 0.0 - 0.3 K/mcL    Absolute Immmature Granulocytes 0.0 0.0 - 0.2 K/mcL       EKG Results       Radiology Results     Imaging Results    None         ED Medication Orders  pupils equally reactive to light and accommodation, none icteric sclera. Normal tympanic membranes with normal light reflex bilaterally. Normal nasal septum, throat clear without lesions or exudate and normal tonsils. Neck: Neck supple. No JVD present. (From admission, onward)    Ordered Start     Status Ordering Provider    11/01/22 1301 11/01/22 1315  amoxicillin-clavulanate (AUGMENTIN) 875-125 MG tablet 1 tablet  ONCE         Last MAR action: Given TOMAS RADFORD    11/01/22 1125 11/01/22 1130  silver nitrate topical stick 1 applicator  ONCE         Last MAR action: Given by Other TOMAS RADFORD    11/01/22 1041 11/01/22 1045  oxymetazoline (AFRIN) 0.05 % nasal spray 2 spray  ONCE         Last MAR action: Given TOMAS RADFORD               [facility]    ED Course and Medical Decision Making  The patient was seen and evaluated by myself for a chief complaint of epistaxis that started about two hours prior to arrival. Pertinent physical exam included patient has blood coming from left nares. There is some blood notes to the left side of the posterior oropharynx. Slightly hypertensive at 149/83. Otherwise stable. Ambulates without difficulty. Denies lightheadedness and shortness of breath. Multiple avenues of treatment. Including afrin and nasal clamp, silver nitrate, and finally anterior packing which has proven to be affective. Patient was given first dose of Augmentin while in the ED. I discussed that he needs to have this removed in 3 days. Will send paper script for oral antibiotics for infection prevention. H&H was stable. INR was 2.9. Patient was observed for approximately 1 hour after packing resolution of bleeding.     I discussed the possible diagnoses with the patient as well as the warning signs and symptoms. The patient understands that this is a provisional diagnosis which can and do change. The diagnosis that the patient was discharged with today is based on the symptoms with which they presented. If any new symptoms occur or if symptoms worsen, the patient understands that they must seek immediate attention for re-evaluation. Patient was also provided with discharge instructions and follow up information. Patient is to follow up with PCP in 1 day with  0. 69 0.50 - 1.50 mg/dL    Calcium, Total 9.7 8.5 - 10.5 mg/dL    ALT 48 14 - 54 U/L    AST 30 15 - 41 U/L    Alkaline Phosphatase 74 32 - 100 U/L    Bilirubin, Total 0.7 0.3 - 1.2 mg/dL    Total Protein 7.4 5.9 - 8.4 g/dL    Albumin 4.1 3.5 - 4.8 g/dL    [address] checkup as follows:    LABORATORY & ORDERS: Orders Placed This Encounter      Comp Metabolic Panel (14) [E]      Urinalysis, Routine [E]      Urine Microscopic w Reflex CULTURE      Vitamin D, 25-Hydroxy [E]    REFERRALS: generate regard to all of the above medical problems.  Appropriate education, treatment, follow up, and red flag warnings were discussed with the patient who demonstrated understanding and is comfortable with this plan. All questions were answered. Patient is stable for discharge.       DX: epistaxis        I, Rylee Martines PA-C, working under the clinical supervision of attending physician, Dr. Immanuel Daniels MD  [address] [place] [id number] [phone]             Clinical Impression     ED Diagnosis   1. Epistaxis  SERVICE TO ENT       Disposition        Discharge 11/1/2022  1:00 PM  Tom Olguin discharge to home/self care.                         Rylee Martines PA-C  11/01/22 1321     ORDERS: Orders Placed This Encounter      Comp Metabolic Panel (14) [E]      Urinalysis, Routine [E]      Urine Microscopic w Reflex CULTURE      Vitamin D, 25-Hydroxy [E]    RECOMMENDATIONS given include: avoid pseudoephedrine or other stimulants/deconges

## 2022-12-19 ENCOUNTER — OFFICE VISIT (OUTPATIENT)
Dept: PODIATRY CLINIC | Facility: CLINIC | Age: 62
End: 2022-12-19
Payer: COMMERCIAL

## 2022-12-19 ENCOUNTER — TELEPHONE (OUTPATIENT)
Dept: PODIATRY CLINIC | Facility: CLINIC | Age: 62
End: 2022-12-19

## 2022-12-19 DIAGNOSIS — S86.011A PARTIAL TEAR OF RIGHT ACHILLES TENDON, INITIAL ENCOUNTER: ICD-10-CM

## 2022-12-19 DIAGNOSIS — M67.88 ACHILLES TENDINOSIS OF RIGHT LOWER EXTREMITY: Primary | ICD-10-CM

## 2022-12-19 DIAGNOSIS — M79.671 RIGHT FOOT PAIN: ICD-10-CM

## 2022-12-19 PROCEDURE — 99213 OFFICE O/P EST LOW 20 MIN: CPT | Performed by: PODIATRIST

## 2022-12-27 ENCOUNTER — LAB ENCOUNTER (OUTPATIENT)
Dept: LAB | Age: 62
End: 2022-12-27
Attending: FAMILY MEDICINE
Payer: COMMERCIAL

## 2022-12-27 ENCOUNTER — OFFICE VISIT (OUTPATIENT)
Dept: FAMILY MEDICINE CLINIC | Facility: CLINIC | Age: 62
End: 2022-12-27
Payer: COMMERCIAL

## 2022-12-27 VITALS
DIASTOLIC BLOOD PRESSURE: 77 MMHG | HEART RATE: 66 BPM | SYSTOLIC BLOOD PRESSURE: 144 MMHG | BODY MASS INDEX: 34.44 KG/M2 | HEIGHT: 59 IN | WEIGHT: 170.81 LBS

## 2022-12-27 DIAGNOSIS — E11.9 TYPE 2 DIABETES MELLITUS WITHOUT COMPLICATION, WITHOUT LONG-TERM CURRENT USE OF INSULIN (HCC): Primary | ICD-10-CM

## 2022-12-27 DIAGNOSIS — E11.9 TYPE 2 DIABETES MELLITUS WITHOUT COMPLICATION, WITHOUT LONG-TERM CURRENT USE OF INSULIN (HCC): ICD-10-CM

## 2022-12-27 LAB
CREAT UR-SCNC: 77.7 MG/DL
GLUCOSE BLOOD: 108
MICROALBUMIN UR-MCNC: <0.5 MG/DL
TEST STRIP LOT #: NORMAL NUMERIC

## 2022-12-27 PROCEDURE — 82570 ASSAY OF URINE CREATININE: CPT

## 2022-12-27 PROCEDURE — 3078F DIAST BP <80 MM HG: CPT | Performed by: FAMILY MEDICINE

## 2022-12-27 PROCEDURE — 3077F SYST BP >= 140 MM HG: CPT | Performed by: FAMILY MEDICINE

## 2022-12-27 PROCEDURE — 82962 GLUCOSE BLOOD TEST: CPT | Performed by: FAMILY MEDICINE

## 2022-12-27 PROCEDURE — 82043 UR ALBUMIN QUANTITATIVE: CPT

## 2022-12-27 PROCEDURE — 99213 OFFICE O/P EST LOW 20 MIN: CPT | Performed by: FAMILY MEDICINE

## 2022-12-27 PROCEDURE — 3008F BODY MASS INDEX DOCD: CPT | Performed by: FAMILY MEDICINE

## 2022-12-27 RX ORDER — LISINOPRIL 2.5 MG/1
2.5 TABLET ORAL DAILY
Qty: 90 TABLET | Refills: 1 | Status: SHIPPED | OUTPATIENT
Start: 2022-12-27

## 2022-12-27 RX ORDER — ATORVASTATIN CALCIUM 10 MG/1
10 TABLET, FILM COATED ORAL NIGHTLY
Qty: 90 TABLET | Refills: 3 | Status: SHIPPED | OUTPATIENT
Start: 2022-12-27

## 2022-12-28 ENCOUNTER — ORDER TRANSCRIPTION (OUTPATIENT)
Dept: PHYSICAL THERAPY | Facility: HOSPITAL | Age: 62
End: 2022-12-28

## 2022-12-28 ENCOUNTER — TELEPHONE (OUTPATIENT)
Dept: PHYSICAL THERAPY | Facility: HOSPITAL | Age: 62
End: 2022-12-28

## 2022-12-28 DIAGNOSIS — M25.562 PAIN IN BOTH KNEES: ICD-10-CM

## 2022-12-28 DIAGNOSIS — M25.561 PAIN IN BOTH KNEES: ICD-10-CM

## 2022-12-28 DIAGNOSIS — M25.562 LEFT KNEE PAIN: Primary | ICD-10-CM

## 2023-01-05 ENCOUNTER — MED REC SCAN ONLY (OUTPATIENT)
Dept: FAMILY MEDICINE CLINIC | Facility: CLINIC | Age: 63
End: 2023-01-05

## 2023-01-10 ENCOUNTER — HOSPITAL ENCOUNTER (OUTPATIENT)
Dept: MAMMOGRAPHY | Age: 63
Discharge: HOME | End: 2023-01-10
Attending: FAMILY MEDICINE
Payer: COMMERCIAL

## 2023-01-10 ENCOUNTER — TELEPHONE (OUTPATIENT)
Dept: FAMILY MEDICINE CLINIC | Facility: CLINIC | Age: 63
End: 2023-01-10

## 2023-01-10 DIAGNOSIS — Z00.00 PHYSICAL EXAM: ICD-10-CM

## 2023-01-10 PROCEDURE — 77063 BREAST TOMOSYNTHESIS BI: CPT | Performed by: FAMILY MEDICINE

## 2023-01-10 PROCEDURE — 77067 SCR MAMMO BI INCL CAD: CPT | Performed by: FAMILY MEDICINE

## 2023-01-10 NOTE — TELEPHONE ENCOUNTER
Patient states she has been experiencing nausea after starting her Atorvastatin 10 mg. She is asking for pcp's recommendation. Please advise.

## 2023-01-10 NOTE — TELEPHONE ENCOUNTER
With  Radha Carter #[id number], no answer on patient home number. Left message on spouse's number to call back-transfer to triage.

## 2023-01-11 ENCOUNTER — OFFICE VISIT (OUTPATIENT)
Dept: PHYSICAL THERAPY | Age: 63
End: 2023-01-11
Attending: ORTHOPAEDIC SURGERY
Payer: COMMERCIAL

## 2023-01-11 ENCOUNTER — TELEPHONE (OUTPATIENT)
Dept: PHYSICAL THERAPY | Facility: HOSPITAL | Age: 63
End: 2023-01-11

## 2023-01-11 DIAGNOSIS — M25.562 LEFT KNEE PAIN: ICD-10-CM

## 2023-01-11 DIAGNOSIS — M25.562 PAIN IN BOTH KNEES: ICD-10-CM

## 2023-01-11 DIAGNOSIS — M25.561 PAIN IN BOTH KNEES: ICD-10-CM

## 2023-01-11 PROCEDURE — 97140 MANUAL THERAPY 1/> REGIONS: CPT | Performed by: PHYSICAL THERAPIST

## 2023-01-11 PROCEDURE — 97161 PT EVAL LOW COMPLEX 20 MIN: CPT | Performed by: PHYSICAL THERAPIST

## 2023-01-11 PROCEDURE — 97110 THERAPEUTIC EXERCISES: CPT | Performed by: PHYSICAL THERAPIST

## 2023-01-11 NOTE — TELEPHONE ENCOUNTER
Using language line : Vinicius Bauer ID # [id number]   Sami-plan of care    Called patient (name and  verified) and instructed on providers message below. Patient verbalized understanding and agrees to plan. Last OV 22  Patient has been prescribed Atorvastatin for the past 4 years but states she has not been taking it  regularly.

## 2023-01-12 ENCOUNTER — OFFICE VISIT (OUTPATIENT)
Dept: PODIATRY CLINIC | Facility: CLINIC | Age: 63
End: 2023-01-12

## 2023-01-12 DIAGNOSIS — M79.671 RIGHT FOOT PAIN: Primary | ICD-10-CM

## 2023-01-12 DIAGNOSIS — M67.88 ACHILLES TENDINOSIS OF RIGHT LOWER EXTREMITY: ICD-10-CM

## 2023-01-12 PROCEDURE — 99213 OFFICE O/P EST LOW 20 MIN: CPT | Performed by: STUDENT IN AN ORGANIZED HEALTH CARE EDUCATION/TRAINING PROGRAM

## 2023-01-17 ENCOUNTER — TELEPHONE (OUTPATIENT)
Dept: PHYSICAL THERAPY | Facility: HOSPITAL | Age: 63
End: 2023-01-17

## 2023-01-18 ENCOUNTER — OFFICE VISIT (OUTPATIENT)
Dept: PHYSICAL THERAPY | Age: 63
End: 2023-01-18
Attending: ORTHOPAEDIC SURGERY
Payer: COMMERCIAL

## 2023-01-18 PROCEDURE — 97140 MANUAL THERAPY 1/> REGIONS: CPT | Performed by: PHYSICAL THERAPIST

## 2023-01-18 PROCEDURE — 97110 THERAPEUTIC EXERCISES: CPT | Performed by: PHYSICAL THERAPIST

## 2023-01-20 ENCOUNTER — OFFICE VISIT (OUTPATIENT)
Dept: PHYSICAL THERAPY | Age: 63
End: 2023-01-20
Attending: ORTHOPAEDIC SURGERY
Payer: COMMERCIAL

## 2023-01-20 PROCEDURE — 97110 THERAPEUTIC EXERCISES: CPT

## 2023-01-20 PROCEDURE — 97140 MANUAL THERAPY 1/> REGIONS: CPT

## 2023-01-22 ENCOUNTER — HOSPITAL ENCOUNTER (OUTPATIENT)
Dept: MRI IMAGING | Age: 63
Discharge: HOME | End: 2023-01-22
Attending: STUDENT IN AN ORGANIZED HEALTH CARE EDUCATION/TRAINING PROGRAM
Payer: COMMERCIAL

## 2023-01-22 ENCOUNTER — HOSPITAL ENCOUNTER (OUTPATIENT)
Dept: MRI IMAGING | Age: 63
End: 2023-01-22
Attending: STUDENT IN AN ORGANIZED HEALTH CARE EDUCATION/TRAINING PROGRAM
Payer: COMMERCIAL

## 2023-01-22 DIAGNOSIS — M67.88 ACHILLES TENDINOSIS OF RIGHT LOWER EXTREMITY: ICD-10-CM

## 2023-01-22 PROCEDURE — 73721 MRI JNT OF LWR EXTRE W/O DYE: CPT | Performed by: STUDENT IN AN ORGANIZED HEALTH CARE EDUCATION/TRAINING PROGRAM

## 2023-01-24 ENCOUNTER — OFFICE VISIT (OUTPATIENT)
Dept: PHYSICAL THERAPY | Age: 63
End: 2023-01-24
Attending: ORTHOPAEDIC SURGERY
Payer: COMMERCIAL

## 2023-01-24 PROCEDURE — 97140 MANUAL THERAPY 1/> REGIONS: CPT | Performed by: PHYSICAL THERAPIST

## 2023-01-24 PROCEDURE — 97110 THERAPEUTIC EXERCISES: CPT | Performed by: PHYSICAL THERAPIST

## 2023-01-27 ENCOUNTER — OFFICE VISIT (OUTPATIENT)
Dept: PHYSICAL THERAPY | Age: 63
End: 2023-01-27
Attending: ORTHOPAEDIC SURGERY
Payer: COMMERCIAL

## 2023-01-27 PROCEDURE — 97110 THERAPEUTIC EXERCISES: CPT

## 2023-01-27 PROCEDURE — 97140 MANUAL THERAPY 1/> REGIONS: CPT

## 2023-01-31 ENCOUNTER — OFFICE VISIT (OUTPATIENT)
Dept: PHYSICAL THERAPY | Age: 63
End: 2023-01-31
Attending: ORTHOPAEDIC SURGERY
Payer: COMMERCIAL

## 2023-01-31 PROCEDURE — 97140 MANUAL THERAPY 1/> REGIONS: CPT

## 2023-01-31 PROCEDURE — 97110 THERAPEUTIC EXERCISES: CPT

## 2023-02-03 ENCOUNTER — OFFICE VISIT (OUTPATIENT)
Dept: PHYSICAL THERAPY | Age: 63
End: 2023-02-03
Attending: ORTHOPAEDIC SURGERY
Payer: COMMERCIAL

## 2023-02-03 PROCEDURE — 97110 THERAPEUTIC EXERCISES: CPT

## 2023-02-03 PROCEDURE — 97140 MANUAL THERAPY 1/> REGIONS: CPT

## 2023-02-07 ENCOUNTER — OFFICE VISIT (OUTPATIENT)
Dept: PHYSICAL THERAPY | Age: 63
End: 2023-02-07
Attending: ORTHOPAEDIC SURGERY
Payer: COMMERCIAL

## 2023-02-07 PROCEDURE — 97140 MANUAL THERAPY 1/> REGIONS: CPT

## 2023-02-07 PROCEDURE — 97110 THERAPEUTIC EXERCISES: CPT

## 2023-02-14 ENCOUNTER — OFFICE VISIT (OUTPATIENT)
Dept: PODIATRY CLINIC | Facility: CLINIC | Age: 63
End: 2023-02-14

## 2023-02-14 DIAGNOSIS — M79.671 RIGHT FOOT PAIN: ICD-10-CM

## 2023-02-14 DIAGNOSIS — M67.88 ACHILLES TENDINOSIS OF RIGHT LOWER EXTREMITY: Primary | ICD-10-CM

## 2023-02-14 PROCEDURE — 99212 OFFICE O/P EST SF 10 MIN: CPT | Performed by: STUDENT IN AN ORGANIZED HEALTH CARE EDUCATION/TRAINING PROGRAM

## 2023-02-16 ENCOUNTER — OFFICE VISIT (OUTPATIENT)
Dept: PHYSICAL THERAPY | Age: 63
End: 2023-02-16
Attending: ORTHOPAEDIC SURGERY
Payer: COMMERCIAL

## 2023-02-16 PROCEDURE — 97140 MANUAL THERAPY 1/> REGIONS: CPT

## 2023-02-16 PROCEDURE — 97110 THERAPEUTIC EXERCISES: CPT

## 2023-02-17 ENCOUNTER — OFFICE VISIT (OUTPATIENT)
Dept: PHYSICAL THERAPY | Age: 63
End: 2023-02-17
Attending: ORTHOPAEDIC SURGERY
Payer: COMMERCIAL

## 2023-02-17 PROCEDURE — 97110 THERAPEUTIC EXERCISES: CPT

## 2023-02-17 PROCEDURE — 97140 MANUAL THERAPY 1/> REGIONS: CPT

## 2023-02-18 NOTE — PATIENT INSTRUCTIONS
-Continue icing/anti-inflammatories and physical therapy for Achilles tendinosis to right lower extremity. Do not see need for surgery at this time. Continue physical therapy for knee pain as well. Follow-up as needed if any pain or other concerns arise.

## 2023-02-22 ENCOUNTER — OFFICE VISIT (OUTPATIENT)
Dept: PHYSICAL THERAPY | Age: 63
End: 2023-02-22
Attending: ORTHOPAEDIC SURGERY
Payer: COMMERCIAL

## 2023-02-22 PROCEDURE — 97140 MANUAL THERAPY 1/> REGIONS: CPT

## 2023-02-22 PROCEDURE — 97110 THERAPEUTIC EXERCISES: CPT

## 2023-03-01 ENCOUNTER — OFFICE VISIT (OUTPATIENT)
Dept: PHYSICAL THERAPY | Age: 63
End: 2023-03-01
Attending: ORTHOPAEDIC SURGERY
Payer: COMMERCIAL

## 2023-03-01 PROCEDURE — 97110 THERAPEUTIC EXERCISES: CPT

## 2023-03-01 PROCEDURE — 97140 MANUAL THERAPY 1/> REGIONS: CPT

## 2023-03-06 ENCOUNTER — OFFICE VISIT (OUTPATIENT)
Dept: PHYSICAL THERAPY | Age: 63
End: 2023-03-06
Attending: ORTHOPAEDIC SURGERY
Payer: COMMERCIAL

## 2023-03-06 PROCEDURE — 97140 MANUAL THERAPY 1/> REGIONS: CPT

## 2023-03-06 PROCEDURE — 97110 THERAPEUTIC EXERCISES: CPT

## 2023-03-13 ENCOUNTER — APPOINTMENT (OUTPATIENT)
Dept: PHYSICAL THERAPY | Age: 63
End: 2023-03-13
Attending: ORTHOPAEDIC SURGERY
Payer: COMMERCIAL

## 2023-08-22 ENCOUNTER — TELEPHONE (OUTPATIENT)
Dept: FAMILY MEDICINE CLINIC | Facility: CLINIC | Age: 63
End: 2023-08-22

## 2023-08-23 RX ORDER — LISINOPRIL 2.5 MG/1
2.5 TABLET ORAL DAILY
Qty: 90 TABLET | Refills: 1 | Status: SHIPPED | OUTPATIENT
Start: 2023-08-23

## 2023-08-23 NOTE — TELEPHONE ENCOUNTER
Please review. Protocol failed / Has no protocol. Requested Prescriptions   Pending Prescriptions Disp Refills    LISINOPRIL 2.5 MG Oral Tab [Pharmacy Med Name: LISINOPRIL 2.5 MG TABLET] 90 tablet 1     Sig: TAKE 1 TABLET BY MOUTH EVERY DAY       Hypertensive Medications Protocol Failed - 8/22/2023 12:12 AM        Failed - Last BP reading less than 140/90     BP Readings from Last 1 Encounters:  12/27/22 : 144/77              Failed - CMP or BMP in past 6 months     No results found for this or any previous visit (from the past 4392 hour(s)).             Failed - In person appointment or virtual visit in the past 6 months     Recent Outpatient Visits              5 months ago     [address] [facility] Final in [place], [address]    Office Visit    5 months ago     [address] [facility] G [facility] in [place], PT    Office Visit    6 months ago     [address] [facility] G Kevin Final in [place], [address]    Office Visit    6 months ago     [address] [facility] G Kevin Final in [facility] [address]    Office Visit    6 months ago     [address] [facility] G Kevin Final in [place], [address] - In person appointment in the past 12 or next 3 months     Recent Outpatient Visits              5 months ago     [address] [facility] G Kevin Final in [place], PT    Office Visit    5 months ago     [address] [facility] G Kevin Final in [place], [facility] [place] Visit    6 months ago     [address] [facility] Antonio G Kevin Final in [place], [address]    Office Visit    6 months ago     [address] [facility] Antonio G Kevin Final in [place], PT    Office Visit    6 months ago     [address] [facility] Antonio Brown Final in [facility] Sheri Bessie Sean, . [address] or GFRNAA > 50     GFR Evaluation  EGFRCR: 99 , resulted on 10/15/2022                Recent Outpatient Visits              5 months ago     [address] [facility] Antonio Brown Final in [facility] Sheri [facility]cre[facility] Estrada, [address] [place] [address] Visit    5 months ago     [facility] in [facility], [address]    Office Visit    6 months ago     [address] [facility]/Marco Antonio Ro in [facility], [address] [place]    Office Visit    6 months ago     [address] [facility]/Marco Antonio Ro in [facility], [address]    Office Visit    6 months ago     [address] [facility]/Marco Antonio Ro in [facility], [address]    Office Visit

## 2024-02-29 ENCOUNTER — TELEPHONE (OUTPATIENT)
Dept: FAMILY MEDICINE CLINIC | Facility: CLINIC | Age: 64
End: 2024-02-29

## 2024-03-12 ENCOUNTER — TELEPHONE (OUTPATIENT)
Dept: FAMILY MEDICINE CLINIC | Facility: CLINIC | Age: 64
End: 2024-03-12

## 2024-03-13 NOTE — TELEPHONE ENCOUNTER
Please review; protocol failed/No Protocol    LOV: 12/27/2022  MyChart message sent to schedule an appointment   [facility] calling to schedule an appointment   Requested Prescriptions   Pending Prescriptions Disp Refills    LISINOPRIL 2.5 MG Oral Tab [Pharmacy Med Name: LISINOPRIL 2.5 MG TABLET] 90 tablet 1     Sig: TAKE 1 TABLET BY MOUTH EVERY DAY       Hypertension Medications Protocol Failed - 3/12/2024 12:14 AM        Failed - CMP or BMP in past 12 months        Failed - Last BP reading less than 140/90     BP Readings from Last 1 Encounters:   12/27/22 144/77               Failed - In person appointment or virtual visit in the past 12 mos or appointment in next 3 mos     Recent Outpatient Visits              1 year ago     [facility], PT    Office Visit    1 year ago     [facility], PT    Office Visit    1 year ago     [facility], PT    Office Visit    1 year ago     [facility], PT    Office Visit    1 year ago     [facility], PT    Office Visit                      Failed - EGFRCR or GFRNAA > 50     GFR Evaluation                 Recent Outpatient Visits              1 year ago     [facility], PT    Office Visit    1 year ago     [facility], PT    Office Visit    1 year ago     [facility], PT    Office Visit    1 year ago     [facility], PT    Office Visit    1 year ago     [facility], PT    Office Visit

## 2024-03-14 RX ORDER — LISINOPRIL 2.5 MG/1
2.5 TABLET ORAL DAILY
Qty: 90 TABLET | Refills: 0 | Status: SHIPPED | OUTPATIENT
Start: 2024-03-14

## 2024-07-08 ENCOUNTER — OFFICE VISIT (OUTPATIENT)
Dept: FAMILY MEDICINE CLINIC | Facility: CLINIC | Age: 64
End: 2024-07-08

## 2024-07-08 VITALS
HEIGHT: 59 IN | BODY MASS INDEX: 35.4 KG/M2 | WEIGHT: 175.63 LBS | HEART RATE: 83 BPM | SYSTOLIC BLOOD PRESSURE: 102 MMHG | DIASTOLIC BLOOD PRESSURE: 63 MMHG

## 2024-07-08 DIAGNOSIS — I10 PRIMARY HYPERTENSION: ICD-10-CM

## 2024-07-08 DIAGNOSIS — Z00.00 PHYSICAL EXAM: Primary | ICD-10-CM

## 2024-07-08 DIAGNOSIS — E11.42 TYPE 2 DIABETES MELLITUS WITH DIABETIC POLYNEUROPATHY, WITHOUT LONG-TERM CURRENT USE OF INSULIN (HCC): ICD-10-CM

## 2024-07-08 LAB — HEMOGLOBIN A1C: 7.2 % (ref 4.3–5.6)

## 2024-07-08 RX ORDER — LISINOPRIL 2.5 MG/1
2.5 TABLET ORAL DAILY
Qty: 90 TABLET | Refills: 0 | Status: SHIPPED | OUTPATIENT
Start: 2024-07-08 | End: 2024-07-08

## 2024-07-08 RX ORDER — LISINOPRIL 2.5 MG/1
2.5 TABLET ORAL DAILY
Qty: 90 TABLET | Refills: 0 | Status: SHIPPED | OUTPATIENT
Start: 2024-07-08

## 2024-07-08 RX ORDER — ATORVASTATIN CALCIUM 10 MG/1
10 TABLET, FILM COATED ORAL NIGHTLY
Qty: 90 TABLET | Refills: 3 | Status: SHIPPED | OUTPATIENT
Start: 2024-07-08

## 2024-07-08 NOTE — PROGRESS NOTES
7/8/2024  4:09 PM    Tarah Anand is a 64 year old female.    Chief complaint(s):   Chief Complaint   Patient presents with    Routine Physical    Referral     Ophthalmology      HPI:     Tarah Anand primary complaint is regarding CPE.     Tarah Anand is a 64 year old female present today for a routine periodic health gynecological screening and/or complete physical examination.  Her last physical exam was 2 year(s) ago.  No LMP recorded. Tarah Anand is G 2, P2, Ab 0.   She has a history of veneral infection significant for none.   She performs breast self-exams monthly .  Her last TDAP (orTD) booster was 10 years ago.  She is not current with her influenza immunization.  Her last Pap smear was 2 year ago and was normal .  Her last mammogram was 1 year(s) ago and was normal.  Regarding colon cancer  screening test she underwent colonoscopy 3 year(s) ago, findings were + polyps.  None smoker.    Patient Tarah Anand is a 64 year old female is here to be evaluated for type 2 diabetes.  Specifically, female has type 2, insulin  None requiring diabetes. Compliance with treatment has been good.  Patient's diabetes was first diagnosed Oct  2022.  Patient follows a 1800 calorie ADA diet.  Patient report experiencing the following diabetes related symptoms; Negative for polyuria, Negative for polydipsia, Negative for blurred vision.  Depression symptoms include none.  Tobacco screen: none  smoker.  Current meds include :  oral hypoglycemic include: NONE  insulin/injectable : - .  Hypoglycemia severity is not applicable.she reports home blood glucose readings have been ? (#s) and believes  having good glucose control.  Most recent lab results include glycohemoglobin 6.9 %, microalbuminuria have been -.  In regard to preventative care, her last ophthalmology exam was in <12 months ago.  Opthalmic evaluation have shown none pathology.  Concurrent relative health problems include hyperlipidemia,  HTN.        HISTORY:  Past Medical History:    Anxiety    Asthma (HCC)    Bronchitis    Chicken pox    per patient did not have it    Colon polyps    dx'd in     CTS (carpal tunnel syndrome)    Diverticulosis    Esophageal reflux    Facial mass    benign    History of blood transfusion    Measles    Osteoarthritis    Visual impairment    Glasses      Past Surgical History:   Procedure Laterality Date    Back surgery      Benign biopsy right      Breast biopsy            x2    Carpal tunnel release      Cataract      Colonoscopy N/A 10/1/2021    Procedure: COLONOSCOPY;  Surgeon: Jonathan Grimes MD;  Location: [facility] ENDOSCOPY    Correct bunion,othr methods      Eye surgery      Foot surgery Right     Repair rotator cuff,acute Left     2014    Spine surgery procedure unlisted      Tubal ligation Bilateral       Family History   Problem Relation Age of Onset    Diabetes Mother         type 2    Hypertension Mother     Asthma Father     Breast Cancer Sister 59    Cancer Sister     Hypertension Sister     Breast Cancer Niece 61    Breast Cancer Other 53        breast cancer    Cancer Other       Social History:   Social History     Socioeconomic History    Marital status:    Tobacco Use    Smoking status: Never    Smokeless tobacco: Never   Vaping Use    Vaping status: Never Used   Substance and Sexual Activity    Alcohol use: Yes     Alcohol/week: 0.0 standard drinks of alcohol     Comment: sometimes/social basis only-beer    Drug use: No   Other Topics Concern    Caffeine Concern Yes     Comment: coffee     Social Determinants of Health      Received from [facility]    Social Connections    Received from [facility]    Housing Stability        Immunizations:   Immunization History   Administered Date(s) Administered    Covid-19 Vaccine [employer] ([employer]) 0.5ml 2021    Covid-19 Vaccine Pfizer Dannie-Sucrose 30 mcg/0.3 ml 2022, 2022    FLULAVAL 6  months & older 0.5 ml Prefilled syringe ([id number]) 10/14/2022    Flulaval, 3 Years & >, IM 10/25/2007, 09/11/2010    Fluvirin, 3 Years & >, Im 09/10/2011, 09/14/2013    Fluzone Vaccine Medicare () 11/27/2012, 09/12/2015    Influenza A (H1N1) 12/19/2009    TD 07/13/2006    TDAP 08/09/2014    Tb Intradermal Test 07/23/2011    Zoster Vaccine Recombinant Adjuvanted (Shingrix) 10/14/2022   Pended Date(s) Pended    TDAP 07/08/2024       Medications (Active prior to today's visit):  Current Outpatient Medications   Medication Sig Dispense Refill    atorvastatin 10 MG Oral Tab Take 1 tablet (10 mg total) by mouth nightly. 90 tablet 3    lisinopril 2.5 MG Oral Tab Take 1 tablet (2.5 mg total) by mouth daily. 90 tablet 0    metFORMIN 500 MG Oral Tab Take 1 tablet (500 mg total) by mouth 2 (two) times daily with meals. 60 tablet 0    atorvastatin 10 MG Oral Tab Take 1 tablet (10 mg total) by mouth nightly. 90 tablet 3    loratadine-pseudoephedrine ER (CLARITIN-D 12 HOUR) 5-120 MG Oral Tablet 12 Hr Take 1 tablet by mouth every morning. (Patient not taking: Reported on 10/14/2022) 20 tablet 0    lubricant jelly External Gel Apply vaginaly before intercourse prn (Patient not taking: Reported on 10/14/2022) 60 g 3    fluticasone propionate 50 MCG/ACT Nasal Suspension 2 sprays by Each Nare route daily. (Patient not taking: Reported on 10/14/2022) 1 each 1    tiZANidine 4 MG Oral Tab Take 4 mg by mouth nightly as needed. (Patient not taking: Reported on 10/14/2022)      hydrocortisone 1 % External Cream Apply topically 2 (two) times daily. (Patient not taking: Reported on 10/14/2022)      ergocalciferol 1.25 MG (63370 UT) Oral Cap TK 1 C PO ONCE WEEKLY (Patient not taking: Reported on 10/14/2022) 12 capsule 3    PROAIR  (90 Base) MCG/ACT Inhalation Aero Soln Inhale 2 puffs into the lungs every 6 (six) hours as needed. Make appt (Patient not taking: Reported on 10/14/2022) 1 Inhaler 0    MISC NATURAL PRODUCT OP Take by  mouth. (Patient not taking: Reported on 10/14/2022)         Allergies:  Allergies   Allergen Reactions    Meperidine UNKNOWN     BP dropped and had to be resuscitated    Benzoin RASH    Iodine (Topical) RASH    Hydrocodone RASH     Not sure if allergic         ROS:   Review of Systems   Constitutional:  Negative for appetite change, fatigue and fever.   HENT:  Negative for ear pain, hearing loss and nosebleeds.    Eyes:  Negative for visual disturbance.   Respiratory:  Negative for apnea, shortness of breath and wheezing.    Cardiovascular:  Negative for chest pain, palpitations and leg swelling.   Gastrointestinal:  Negative for abdominal pain, blood in stool, constipation, nausea and vomiting.   Endocrine: Negative for polydipsia and polyuria.   Genitourinary:  Negative for dyspareunia and hematuria.   Musculoskeletal:  Negative for arthralgias and back pain.   Skin:  Negative for rash.   Allergic/Immunologic: Negative for food allergies.   Neurological:  Negative for dizziness, syncope, light-headedness and headaches.   Psychiatric/Behavioral:  Negative for sleep disturbance.        PHYSICAL EXAM:   VS: /63   Pulse 83   Ht 4' 11\" (1.499 m)   Wt 175 lb 9.6 oz (79.7 kg)   LMP 08/01/2017   BMI 35.47 kg/m²     Physical Exam  Vitals reviewed.   Constitutional:       Appearance: She is well-developed.   HENT:      Head: Normocephalic.      Right Ear: Hearing, tympanic membrane, ear canal and external ear normal.      Left Ear: Hearing, tympanic membrane, ear canal and external ear normal.      Nose: Nose normal.      Mouth/Throat:      Mouth: Mucous membranes are moist.   Eyes:      Extraocular Movements: Extraocular movements intact.      Conjunctiva/sclera: Conjunctivae normal.      Pupils: Pupils are equal, round, and reactive to light.   Neck:      Thyroid: No thyromegaly.   Cardiovascular:      Rate and Rhythm: Normal rate and regular rhythm.      Heart sounds: Normal heart sounds, S1 normal and S2  normal. No murmur heard.  Pulmonary:      Effort: Pulmonary effort is normal.      Breath sounds: Normal breath sounds.   Chest:   Breasts:     Right: No mass.      Left: No mass.   Abdominal:      General: Bowel sounds are normal.      Palpations: Abdomen is soft. There is no mass.      Tenderness: There is no abdominal tenderness.      Hernia: No hernia is present.   Musculoskeletal:      Cervical back: Neck supple.      Comments: Spine without scoliosis or kyphosis.  Range of motions of both upper and lower extremities are normal.   Feet:      Right foot:      Protective Sensation: 10 sites tested.  7 sites sensed.      Left foot:      Protective Sensation: 10 sites tested.  7 sites sensed.   Lymphadenopathy:      Cervical: No cervical adenopathy.      Comments: LEs no edema    Skin:     Findings: No rash.   Neurological:      General: No focal deficit present.      Mental Status: She is alert.      Deep Tendon Reflexes:      Reflex Scores:       Patellar reflexes are 2+ on the right side and 2+ on the left side.  Psychiatric:         Mood and Affect: Mood and affect normal.         LABORATORY RESULTS:   No results found for: \"URCOLOR\", \"URCLA\", \"URINELEUK\", \"URINENITRITE\", \"URINEBLOOD\"   Results for orders placed or performed in visit on 07/08/24   POC Glycohemoglobin [[id number]]   Result Value Ref Range    HEMOGLOBIN A1C 7.2 (A) 4.3 - 5.6 %    Cartridge Lot# [id number] Numeric    Cartridge Expiration Date 2/12/26 Date       EKG / Spirometry : -     Radiology: No results found.     ASSESSMENT/PLAN:   Assessment   Encounter Diagnoses   Name Primary?    Physical exam Yes    Type 2 diabetes mellitus with diabetic polyneuropathy, without long-term current use of insulin (HCC)     Primary hypertension        1. Physical exam    Assessment and Plan:     [facility] checkup as follows:    LABORATORY & ORDERS:   Orders Placed This Encounter   Procedures    CBC With Differential With Platelet    Comp Metabolic  Panel (14)    Lipid Panel    TSH W Reflex To Free T4    Vitamin D    Urinalysis with Culture Reflex    POC Glycohemoglobin [[id number]]    Microalb/Creat Ratio, Random Urine    TETANUS, DIPHTHERIA TOXOIDS AND ACELLULAR PERTUSIS VACCINE (TDAP), >7 YEARS, IM USE     REFERRALS: generated today : TETANUS, DIPHTHERIA TOXOIDS AND ACELLULAR PERTUSIS VACCINE (TDAP), >7 YEARS, IM USE  OPHTHALMOLOGY - EXTERNAL  [facility] KAMILAH 2D+3D SCREENING BILAT (CPT=77067/72255) .    IMMUNIZATIONS ordered and given today include: TDAP.    RECOMMENDATIONS given include: ANTICIPATORY GUIDANCE  topics covered today include: safety (i.e. seat belts, helmets, sunscreen, protective sports gear ), nutrition (i.e. healthy meals and snacks (i.e. avoid junk food and high-carbohydrate foods); athletic conditioning, fluids; low fat milk, limit to less than 20 oz. a day; dental care with her dentist), and healthy habits & social competence & responsibilities: Recommendations on physical activity; exercise daily or at least 3 times a week for 30-60 minutes doing cardiovascular exercise. Patient educated on self breast examination to be done on a monthly basis.  Consider a  if over weight and/or having difficult in staying active. Attempt to keep a schedule that includes adequate sleep, and physical activities/exercise. Patient was educated on sexual transmitted disease. Best to abstain from sexual intercourse until she is ready to form a family. Use of condoms may prevent transmission of infections as well as pregnancy.   Contraception option chosen by patient was NA.  REFUSALS:  Although recommended, the patient refuses the following: none .      FOLLOW-UP: Schedule a follow-up visit in 12 months.         2. Type 2 diabetes mellitus with diabetic polyneuropathy, without long-term current use of insulin (HCC)    DIABETES A&P    LABORATORY & ORDERS: Blood test(s) ordered today ;   Orders Placed This Encounter   Procedures    CBC With Differential  With Platelet    Comp Metabolic Panel (14)    Lipid Panel    TSH W Reflex To Free T4    Vitamin D    Urinalysis with Culture Reflex    POC Glycohemoglobin [[id number]]    Microalb/Creat Ratio, Random Urine    TETANUS, DIPHTHERIA TOXOIDS AND ACELLULAR PERTUSIS VACCINE (TDAP), >7 YEARS, IM USE     Additional orders include:   MEDICATIONS:    atorvastatin 10 MG Oral Tab, Take 1 tablet (10 mg total) by mouth nightly., Disp: 90 tablet, Rfl: 3    lisinopril 2.5 MG Oral Tab, Take 1 tablet (2.5 mg total) by mouth daily., Disp: 90 tablet, Rfl: 0    metFORMIN 500 MG Oral Tab, Take 1 tablet (500 mg total) by mouth 2 (two) times daily with meals., Disp: 60 tablet, Rfl: 0    atorvastatin 10 MG Oral Tab, Take 1 tablet (10 mg total) by mouth nightly., Disp: 90 tablet, Rfl: 3    loratadine-pseudoephedrine ER (CLARITIN-D 12 HOUR) 5-120 MG Oral Tablet 12 Hr, Take 1 tablet by mouth every morning. (Patient not taking: Reported on 10/14/2022), Disp: 20 tablet, Rfl: 0    lubricant jelly External Gel, Apply vaginaly before intercourse prn (Patient not taking: Reported on 10/14/2022), Disp: 60 g, Rfl: 3    fluticasone propionate 50 MCG/ACT Nasal Suspension, 2 sprays by Each Nare route daily. (Patient not taking: Reported on 10/14/2022), Disp: 1 each, Rfl: 1    tiZANidine 4 MG Oral Tab, Take 4 mg by mouth nightly as needed. (Patient not taking: Reported on 10/14/2022), Disp: , Rfl:     hydrocortisone 1 % External Cream, Apply topically 2 (two) times daily. (Patient not taking: Reported on 10/14/2022), Disp: , Rfl:     ergocalciferol 1.25 MG (49795 UT) Oral Cap, TK 1 C PO ONCE WEEKLY (Patient not taking: Reported on 10/14/2022), Disp: 12 capsule, Rfl: 3    PROAIR  (90 Base) MCG/ACT Inhalation Aero Soln, Inhale 2 puffs into the lungs every 6 (six) hours as needed. Make appt (Patient not taking: Reported on 10/14/2022), Disp: 1 Inhaler, Rfl: 0    MISC NATURAL PRODUCT OP, Take by mouth. (Patient not taking: Reported on 10/14/2022), Disp: ,  Rfl: .  Requested Prescriptions     Signed Prescriptions Disp Refills    atorvastatin 10 MG Oral Tab 90 tablet 3     Sig: Take 1 tablet (10 mg total) by mouth nightly.    lisinopril 2.5 MG Oral Tab 90 tablet 0     Sig: Take 1 tablet (2.5 mg total) by mouth daily.    metFORMIN 500 MG Oral Tab 60 tablet 0     Sig: Take 1 tablet (500 mg total) by mouth 2 (two) times daily with meals.    atorvastatin 10 MG Oral Tab 90 tablet 3     Sig: Take 1 tablet (10 mg total) by mouth nightly.    REFERRALS:       Procedures    CBC With Differential With Platelet    Comp Metabolic Panel (14)    Lipid Panel    TSH W Reflex To Free T4    Vitamin D    Urinalysis with Culture Reflex    POC Glycohemoglobin [[id number]]    Microalb/Creat Ratio, Random Urine    OPHTHALMOLOGY - EXTERNAL     RECOMMENDATIONS: instructed in use of glucometer ( check fasting glucose daily), return for training in administering insulin injections, adherence to an 1800 calorie ADA diet,  10 pound weight loss, a graduated exercise program, HgbA1C level checked quarterly, daily foot self-inspection, need for yearly flu shots, and avoid all sodas, juices, candy, chocolates, cakes, ice cream, etc.      FOLLOW-UP: Schedule a follow-up visit in 3 months.       COUNSELING: The patient was counseled concerning the relationship between diabetes control and macrovascular disease including cardiovascular, cerebrovascular and peripheral vascular disease. The patient was counseled concerning the relationship between diabetes control and retinopathy, nephropathy, and neuropathy. Advised as to the targets of pre-meal glucoses ( mg/dl) and post meal glucoses (<140-160 mg/dl) Home glucose testing discussed. The A1c target of <7% according to ADA and <6.5% according to AACE were discussed.             3. Primary hypertension    Doing well  CPM  Follow up KPA/ PRN           Orders This Visit:  Orders Placed This Encounter   Procedures    CBC With Differential With Platelet    Comp  Metabolic Panel (14)    Lipid Panel    TSH W Reflex To Free T4    Vitamin D    Urinalysis with Culture Reflex    POC Glycohemoglobin [[id number]]    Microalb/Creat Ratio, Random Urine    TETANUS, DIPHTHERIA TOXOIDS AND ACELLULAR PERTUSIS VACCINE (TDAP), >7 YEARS, IM USE       Meds This Visit:  Requested Prescriptions     Signed Prescriptions Disp Refills    atorvastatin 10 MG Oral Tab 90 tablet 3     Sig: Take 1 tablet (10 mg total) by mouth nightly.    lisinopril 2.5 MG Oral Tab 90 tablet 0     Sig: Take 1 tablet (2.5 mg total) by mouth daily.    metFORMIN 500 MG Oral Tab 60 tablet 0     Sig: Take 1 tablet (500 mg total) by mouth 2 (two) times daily with meals.    atorvastatin 10 MG Oral Tab 90 tablet 3     Sig: Take 1 tablet (10 mg total) by mouth nightly.       Imaging & Referrals:  TETANUS, DIPHTHERIA TOXOIDS AND ACELLULAR PERTUSIS VACCINE (TDAP), >7 YEARS, IM USE  OPHTHALMOLOGY - EXTERNAL  [facility] KAMILAH 2D+3D SCREENING BILAT (CPT=77067/75428)         ZHOU RAMOS MD

## 2024-07-31 DIAGNOSIS — E11.42 TYPE 2 DIABETES MELLITUS WITH DIABETIC POLYNEUROPATHY, WITHOUT LONG-TERM CURRENT USE OF INSULIN (HCC): ICD-10-CM

## 2024-07-31 NOTE — TELEPHONE ENCOUNTER
90 Day Prescription Request:    Metformin  Mg Tablet - 180 tablets - Take 1 tablet by mouth twice a day with meals    Please Advise

## 2024-08-05 NOTE — TELEPHONE ENCOUNTER
Please review. Protocol Failed; No Protocol    Requested Prescriptions   Pending Prescriptions Disp Refills    metFORMIN 500 MG Oral Tab 180 tablet 3     Sig: Take 1 tablet (500 mg total) by mouth 2 (two) times daily with meals.       Diabetes Medication Protocol Failed - 7/31/2024  2:10 PM        Failed - EGFRCR or GFRNAA > 50     GFR Evaluation            Failed - GFR in the past 12 months        Passed - Last A1C < 7.5 and within past 6 months     Lab Results   Component Value Date    A1C 7.2 (A) 07/08/2024             Passed - In person appointment or virtual visit in the past 6 mos or appointment in next 3 mos     Recent Outpatient Visits              4 weeks ago Physical exam    [facility], [address], Sagar Kearney MD    Office Visit    1 year ago     [facility], PT    Office Visit    1 year ago     [facility], PT    Office Visit    1 year ago     [facility] in [facility] [place], PT    Office Visit    1 year ago     [facility] [place], PT    Office Visit                      Passed - Microalbumin procedure in past 12 months or taking ACE/ARB                 Recent Outpatient Visits              4 weeks ago Physical exam    [facility], [address], Sagar Kearney MD    Office Visit    1 year ago     [facility], PT    Office Visit    1 year ago     [facility] [place], [facility]    Office Visit    1 year ago     [facility] [place], PT    Office Visit    1 year ago     [facility] [place], PT    Office Visit

## 2025-04-16 ENCOUNTER — TELEPHONE (OUTPATIENT)
Dept: FAMILY MEDICINE CLINIC | Facility: CLINIC | Age: 65
End: 2025-04-16

## 2025-04-16 DIAGNOSIS — I10 PRIMARY HYPERTENSION: ICD-10-CM

## 2025-04-18 NOTE — TELEPHONE ENCOUNTER
Please review.  Protocol failed / Has no protocol.    No Active/ Future labs for patient to complete  *most recent labs 10/2022*    Will route to Call Center to make a phone attempt for patient to schedule an office visit with Primary Care Provider .      Requested Prescriptions   Pending Prescriptions Disp Refills    LISINOPRIL 2.5 MG Oral Tab [Pharmacy Med Name: LISINOPRIL 2.5 MG TABLET] 90 tablet 0     Sig: TAKE 1 TABLET BY MOUTH EVERY DAY       Hypertension Medications Protocol Failed - 4/18/2025  4:18 PM        Failed - CMP or BMP in past 12 months        Failed - EGFRCR or GFRNAA > 50        Passed - Last BP reading less than 140/90        Passed - In person appointment or virtual visit in the past 12 mos or appointment in next 3 mos        Passed - Medication is active on med list

## 2025-04-18 NOTE — TELEPHONE ENCOUNTER
Please call patient to make an appointment.  Thank you    Coming due for annual Physical Exam   [facility]

## 2025-04-19 RX ORDER — LISINOPRIL 2.5 MG/1
2.5 TABLET ORAL DAILY
Qty: 90 TABLET | Refills: 0 | Status: SHIPPED | OUTPATIENT
Start: 2025-04-19

## 2025-05-05 ENCOUNTER — OFFICE VISIT (OUTPATIENT)
Dept: FAMILY MEDICINE CLINIC | Facility: CLINIC | Age: 65
End: 2025-05-05

## 2025-05-05 ENCOUNTER — LAB ENCOUNTER (OUTPATIENT)
Dept: LAB | Age: 65
End: 2025-05-05
Attending: FAMILY MEDICINE
Payer: MEDICAID

## 2025-05-05 VITALS
BODY MASS INDEX: 35 KG/M2 | DIASTOLIC BLOOD PRESSURE: 76 MMHG | HEART RATE: 73 BPM | WEIGHT: 171 LBS | SYSTOLIC BLOOD PRESSURE: 143 MMHG

## 2025-05-05 DIAGNOSIS — E11.42 TYPE 2 DIABETES MELLITUS WITH DIABETIC POLYNEUROPATHY, WITHOUT LONG-TERM CURRENT USE OF INSULIN (HCC): ICD-10-CM

## 2025-05-05 DIAGNOSIS — E11.42 TYPE 2 DIABETES MELLITUS WITH DIABETIC POLYNEUROPATHY, WITHOUT LONG-TERM CURRENT USE OF INSULIN (HCC): Primary | ICD-10-CM

## 2025-05-05 DIAGNOSIS — I10 PRIMARY HYPERTENSION: ICD-10-CM

## 2025-05-05 LAB
ALBUMIN SERPL-MCNC: 4.5 G/DL (ref 3.2–4.8)
ALBUMIN/GLOB SERPL: 2 {RATIO} (ref 1–2)
ALP LIVER SERPL-CCNC: 88 U/L (ref 50–130)
ALT SERPL-CCNC: 34 U/L (ref 10–49)
ANION GAP SERPL CALC-SCNC: 8 MMOL/L (ref 0–18)
AST SERPL-CCNC: 22 U/L (ref ?–34)
BILIRUB SERPL-MCNC: 0.3 MG/DL (ref 0.2–1.1)
BUN BLD-MCNC: 15 MG/DL (ref 9–23)
BUN/CREAT SERPL: 20 (ref 10–20)
CALCIUM BLD-MCNC: 9.8 MG/DL (ref 8.7–10.4)
CHLORIDE SERPL-SCNC: 105 MMOL/L (ref 98–112)
CHOLEST SERPL-MCNC: 157 MG/DL (ref ?–200)
CO2 SERPL-SCNC: 29 MMOL/L (ref 21–32)
CREAT BLD-MCNC: 0.75 MG/DL (ref 0.55–1.02)
CREAT UR-SCNC: 66.2 MG/DL
EGFRCR SERPLBLD CKD-EPI 2021: 89 ML/MIN/1.73M2 (ref 60–?)
FASTING PATIENT LIPID ANSWER: NO
FASTING STATUS PATIENT QL REPORTED: NO
GLOBULIN PLAS-MCNC: 2.3 G/DL (ref 2–3.5)
GLUCOSE BLD-MCNC: 103 MG/DL (ref 70–99)
HDLC SERPL-MCNC: 46 MG/DL (ref 40–59)
HEMOGLOBIN A1C: 7 % (ref 4.3–5.6)
LDLC SERPL CALC-MCNC: 95 MG/DL (ref ?–100)
MICROALBUMIN UR-MCNC: <0.3 MG/DL
NONHDLC SERPL-MCNC: 111 MG/DL (ref ?–130)
OSMOLALITY SERPL CALC.SUM OF ELEC: 295 MOSM/KG (ref 275–295)
POTASSIUM SERPL-SCNC: 4.7 MMOL/L (ref 3.5–5.1)
PROT SERPL-MCNC: 6.8 G/DL (ref 5.7–8.2)
SODIUM SERPL-SCNC: 142 MMOL/L (ref 136–145)
TRIGL SERPL-MCNC: 82 MG/DL (ref 30–149)
VLDLC SERPL CALC-MCNC: 13 MG/DL (ref 0–30)

## 2025-05-05 PROCEDURE — 36415 COLL VENOUS BLD VENIPUNCTURE: CPT

## 2025-05-05 PROCEDURE — 82570 ASSAY OF URINE CREATININE: CPT

## 2025-05-05 PROCEDURE — 80053 COMPREHEN METABOLIC PANEL: CPT

## 2025-05-05 PROCEDURE — 80061 LIPID PANEL: CPT

## 2025-05-05 PROCEDURE — 82043 UR ALBUMIN QUANTITATIVE: CPT

## 2025-05-05 RX ORDER — FLUTICASONE PROPIONATE 50 MCG
2 SPRAY, SUSPENSION (ML) NASAL DAILY
Qty: 1 EACH | Refills: 1 | Status: SHIPPED | OUTPATIENT
Start: 2025-05-05

## 2025-05-05 RX ORDER — LISINOPRIL 5 MG/1
5 TABLET ORAL DAILY
Qty: 30 TABLET | Refills: 1 | Status: SHIPPED | OUTPATIENT
Start: 2025-05-05

## 2025-05-05 RX ORDER — ERGOCALCIFEROL 1.25 MG/1
CAPSULE, LIQUID FILLED ORAL
Qty: 12 CAPSULE | Refills: 0 | Status: SHIPPED | OUTPATIENT
Start: 2025-05-05

## 2025-05-05 RX ORDER — ERGOCALCIFEROL 1.25 MG/1
CAPSULE, LIQUID FILLED ORAL
Qty: 12 CAPSULE | Refills: 3 | Status: SHIPPED | OUTPATIENT
Start: 2025-05-05 | End: 2025-05-05

## 2025-05-05 RX ORDER — ALBUTEROL SULFATE 90 UG/1
2 AEROSOL, METERED RESPIRATORY (INHALATION) EVERY 6 HOURS PRN
Qty: 1 EACH | Refills: 0 | Status: SHIPPED | OUTPATIENT
Start: 2025-05-05 | End: 2025-05-05

## 2025-05-05 RX ORDER — ALBUTEROL SULFATE 90 UG/1
2 AEROSOL, METERED RESPIRATORY (INHALATION) EVERY 6 HOURS PRN
Qty: 1 EACH | Refills: 0 | Status: SHIPPED | OUTPATIENT
Start: 2025-05-05

## 2025-05-05 NOTE — PROGRESS NOTES
5/5/2025  9:14 AM    Tarah Anand is a 65 year old female.    Chief complaint(s):   Chief Complaint   Patient presents with    Diabetes     2 month      HPI:     Tarah Anand primary complaint is regarding multiple complaints.     Patient Tarah Anand is a 65 year old female is here to be evaluated for type 2 diabetes.  Specifically, female has type 2, insulin  None requiring diabetes. Compliance with treatment has been good.  Patient's diabetes was first diagnosed Oct  2022.  Patient follows a 1800 calorie ADA diet.  Patient report experiencing the following diabetes related symptoms; Negative for polyuria, Negative for polydipsia, Negative for blurred vision.  Depression symptoms include none.  Tobacco screen: none  smoker.  Current meds include :  oral hypoglycemic include: Metformin  500 mg  insulin/injectable : - .  Statins: Atorvastatin 10 mg  Hypoglycemia severity is not applicable.she reports home blood glucose readings have been ? (#s) and believes  having good glucose control.  Most recent lab results include glycohemoglobin 7.2 %, microalbuminuria have been -.  In regard to preventative care, her last ophthalmology exam was in <12 months ago.  Opthalmic evaluation have shown none pathology.  Concurrent relative health problems include hyperlipidemia.    Tarah Barbosa a 65 year old female presents with hypertension.  This was first diagnosed more than 1 years ago.  Current nonpharmacologic treatment includes low sodium diet, exercise, and meditation.  her current cardiac medication(s) regimen includes: Lisinopril 2.5 mg.  she has not kept a blood pressure diary, but states that her blood pressures have been well controll.  she is tolerating her medication(s)  well without side effects.  Compliance with treatment has been good.      HISTORY:  Past Medical History[1]   Past Surgical History[2]   Family History[3]   Social History: Short Social Hx on File[4]     Immunizations:   Immunization History    Administered Date(s) Administered    Covid-19 Vaccine Pongr (J&J) 0.5ml 03/22/2021    Covid-19 Vaccine Pfizer Dannie-Sucrose 30 mcg/0.3 ml 01/19/2022, 05/26/2022    FLULAVAL 6 months & older 0.5 ml Prefilled syringe ([id number]) 10/14/2022    Flulaval, 3 Years & >, IM 10/25/2007, 09/11/2010    Fluvirin, 3 Years & >, Im 09/10/2011, 09/14/2013    Fluzone Vaccine Medicare () 11/27/2012, 09/12/2015    Influenza A (H1N1) 12/19/2009    TD 07/13/2006    TDAP 08/09/2014    Tb Intradermal Test 07/23/2011    Zoster Vaccine Recombinant Adjuvanted (Shingrix) 10/14/2022   Pended Date(s) Pended    TDAP 07/08/2024       Medications (Active prior to today's visit):  Current Medications[5]    Allergies:  Allergies[6]      ROS:   Review of Systems   Constitutional:  Negative for appetite change and fever.   Eyes:  Negative for visual disturbance.   Respiratory:  Negative for shortness of breath.    Cardiovascular:  Negative for chest pain.   Gastrointestinal:  Negative for abdominal pain, nausea and vomiting.   Musculoskeletal:  Negative for back pain.   Skin:  Negative for rash.   Neurological:  Negative for dizziness and headaches.       PHYSICAL EXAM:   VS: /76 (BP Location: Right arm, Patient Position: Sitting, Cuff Size: adult)   Pulse 73   Wt 171 lb (77.6 kg)   LMP 08/01/2017   BMI 34.54 kg/m²     Physical Exam  Vitals reviewed.   Constitutional:       General: She is not in acute distress.     Appearance: Normal appearance.   HENT:      Head: Normocephalic.   Eyes:      Conjunctiva/sclera: Conjunctivae normal.   Cardiovascular:      Rate and Rhythm: Normal rate.   Pulmonary:      Effort: Pulmonary effort is normal.   Musculoskeletal:      Cervical back: Neck supple.   Feet:      Right foot:      Protective Sensation: 10 sites tested.  6 sites sensed.      Left foot:      Protective Sensation: 10 sites tested.  6 sites sensed.   Skin:     Findings: No rash.   Psychiatric:         Mood and Affect: Mood normal.          LABORATORY RESULTS:   No results found for: \"URCOLOR\", \"URCLA\", \"URINELEUK\", \"URINENITRITE\", \"URINEBLOOD\"   Results for orders placed or performed in visit on 25   POC Glycohemoglobin [42695]    Collection Time: 25  9:19 AM   Result Value Ref Range    HEMOGLOBIN A1C 7.0 (A) 4.3 - 5.6 %    Cartridge Lot# [id number] Numeric    Cartridge Expiration Date 26 Date       EKG / Spirometry : -     Radiology: No results found.     ASSESSMENT/PLAN:   Assessment   Encounter Diagnoses   Name Primary?    Type 2 diabetes mellitus with diabetic polyneuropathy, without long-term current use of insulin (HCC) Yes    Primary hypertension        1. Type 2 diabetes mellitus with diabetic polyneuropathy, without long-term current use of insulin (HCC)    DIABETES A&P    LABORATORY & ORDERS: Blood test(s) ordered today ;   Orders Placed This Encounter   Procedures    POC Glycohemoglobin [20007]    POC Glycohemoglobin [81715]    Microalb/Creat Ratio, Random Urine    Comp Metabolic Panel (14)    Lipid Panel     Additional orders include: CPM  MEDICATIONS:  Current Medications[7].  Requested Prescriptions     Signed Prescriptions Disp Refills    fluticasone propionate 50 MCG/ACT Nasal Suspension 1 each 1     Si sprays by Each Nare route daily.    ergocalciferol 1.25 MG (49000 UT) Oral Cap 12 capsule 0     Sig: TK 1 C PO ONCE WEEKLY    lisinopril 5 MG Oral Tab 30 tablet 1     Sig: Take 1 tablet (5 mg total) by mouth daily. *Appointment needed for further refills.    PROAIR  (90 Base) MCG/ACT Inhalation Aero Soln 1 each 0     Sig: Inhale 2 puffs into the lungs every 6 (six) hours as needed. Make appt    metFORMIN 500 MG Oral Tab 180 tablet 3     Sig: Take 1 tablet (500 mg total) by mouth 2 (two) times daily with meals.      REFERRALS:       Procedures    POC Glycohemoglobin [79457]    POC Glycohemoglobin [21318]    Microalb/Creat Ratio, Random Urine    Comp Metabolic Panel (14)    Lipid Panel    OPHTHALMOLOGY  - INTERNAL     RECOMMENDATIONS: instructed in use of glucometer ( check fasting glucose daily), return for training in administering insulin injections, adherence to an 1800 calorie ADA diet,  5 pound weight loss, a graduated exercise program, HgbA1C level checked quarterly, daily foot self-inspection, need for yearly flu shots, and avoid all sodas, juices, candy, chocolates, cakes, ice cream, etc.      FOLLOW-UP: Schedule a follow-up visit in 6 months.       COUNSELING: The patient was counseled concerning the relationship between diabetes control and macrovascular disease including cardiovascular, cerebrovascular and peripheral vascular disease. The patient was counseled concerning the relationship between diabetes control and retinopathy, nephropathy, and neuropathy. Advised as to the targets of pre-meal glucoses ( mg/dl) and post meal glucoses (<140-160 mg/dl) Home glucose testing discussed. The A1c target of <7% according to ADA and <6.5% according to AACE were discussed.          2. Primary hypertension    MEDICATIONS: increased Lisinopril to 5 mg  Refills:   Requested Prescriptions     Signed Prescriptions Disp Refills    fluticasone propionate 50 MCG/ACT Nasal Suspension 1 each 1     Si sprays by Each Nare route daily.    ergocalciferol 1.25 MG (40349 UT) Oral Cap 12 capsule 0     Sig: TK 1 C PO ONCE WEEKLY    lisinopril 5 MG Oral Tab 30 tablet 1     Sig: Take 1 tablet (5 mg total) by mouth daily. *Appointment needed for further refills.    PROAIR  (90 Base) MCG/ACT Inhalation Aero Soln 1 each 0     Sig: Inhale 2 puffs into the lungs every 6 (six) hours as needed. Make appt    metFORMIN 500 MG Oral Tab 180 tablet 3     Sig: Take 1 tablet (500 mg total) by mouth 2 (two) times daily with meals.      LABORATORY & ORDERS:   Orders Placed This Encounter   Procedures    POC Glycohemoglobin [85086]    POC Glycohemoglobin [02974]    Microalb/Creat Ratio, Random Urine    Comp Metabolic Panel (14)     Lipid Panel     RECOMMENDATIONS given include: avoid pseudoephedrine or other stimulants/decongestants in common cold remedies, decrease consumption of alcohol, perform routine monitoring of blood pressure with home blood pressure cuff, exercise, reduction of dietary salt intake, take medication as prescribed, try not to miss doses, smoking cessation, weight loss, and stress reduction.    FOLLOW-UP: Schedule a follow-up visit in 6 months.                Orders This Visit:  Orders Placed This Encounter   Procedures    POC Glycohemoglobin [53178]    POC Glycohemoglobin [97914]    Microalb/Creat Ratio, Random Urine    Comp Metabolic Panel (14)    Lipid Panel       Meds This Visit:  Requested Prescriptions     Signed Prescriptions Disp Refills    fluticasone propionate 50 MCG/ACT Nasal Suspension 1 each 1     Si sprays by Each Nare route daily.    ergocalciferol 1.25 MG (95772 UT) Oral Cap 12 capsule 0     Sig: TK 1 C PO ONCE WEEKLY    lisinopril 5 MG Oral Tab 30 tablet 1     Sig: Take 1 tablet (5 mg total) by mouth daily. *Appointment needed for further refills.    PROAIR  (90 Base) MCG/ACT Inhalation Aero Soln 1 each 0     Sig: Inhale 2 puffs into the lungs every 6 (six) hours as needed. Make appt    metFORMIN 500 MG Oral Tab 180 tablet 3     Sig: Take 1 tablet (500 mg total) by mouth 2 (two) times daily with meals.       Imaging & Referrals:  OPHTHALMOLOGY - INTERNAL         ZHOU RAMOS MD         [1]   Past Medical History:   Anxiety    Asthma (HCC)    Bronchitis    Chicken pox    per patient did not have it    Colon polyps    dx'd in     CTS (carpal tunnel syndrome)    Diverticulosis    Esophageal reflux    Facial mass    benign    History of blood transfusion    Measles    Osteoarthritis    Visual impairment    Glasses   [2]   Past Surgical History:  Procedure Laterality Date    Back surgery      Benign biopsy right      Breast biopsy            x2    Carpal tunnel release       Cataract      Colonoscopy N/A 10/1/2021    Procedure: COLONOSCOPY;  Surgeon: Jonathan Grimes MD;  Location: [facility] ENDOSCOPY    Correct bunion,othr methods      Eye surgery      Foot surgery Right     Repair rotator cuff,acute Left     1/2014    Spine surgery procedure unlisted      Tubal ligation Bilateral    [3]   Family History  Problem Relation Age of Onset    Diabetes Mother         type 2    Hypertension Mother     Asthma Father     Breast Cancer Sister 59    Cancer Sister     Hypertension Sister     Breast Cancer Niece 61    Breast Cancer Other 53        breast cancer    Cancer Other    [4]   Social History  Socioeconomic History    Marital status:    Tobacco Use    Smoking status: Never    Smokeless tobacco: Never   Vaping Use    Vaping status: Never Used   Substance and Sexual Activity    Alcohol use: Yes     Alcohol/week: 0.0 standard drinks of alcohol     Comment: sometimes/social basis only-beer    Drug use: No   Other Topics Concern    Caffeine Concern Yes     Comment: coffee     Social Drivers of Health      Received [facility]    Housing Stability   [5]   Current Outpatient Medications   Medication Sig Dispense Refill    fluticasone propionate 50 MCG/ACT Nasal Suspension 2 sprays by Each Nare route daily. 1 each 1    ergocalciferol 1.25 MG (69454 UT) Oral Cap TK 1 C PO ONCE WEEKLY 12 capsule 0    lisinopril 5 MG Oral Tab Take 1 tablet (5 mg total) by mouth daily. *Appointment needed for further refills. 30 tablet 1    PROAIR  (90 Base) MCG/ACT Inhalation Aero Soln Inhale 2 puffs into the lungs every 6 (six) hours as needed. Make appt 1 each 0    metFORMIN 500 MG Oral Tab Take 1 tablet (500 mg total) by mouth 2 (two) times daily with meals. 180 tablet 3    atorvastatin 10 MG Oral Tab Take 1 tablet (10 mg total) by mouth nightly. 90 tablet 3    atorvastatin 10 MG Oral Tab Take 1 tablet (10 mg total) by mouth nightly. 90 tablet 3    loratadine-pseudoephedrine ER  (CLARITIN-D 12 HOUR) 5-120 MG Oral Tablet 12 Hr Take 1 tablet by mouth every morning. (Patient not taking: Reported on 5/5/2025) 20 tablet 0    lubricant jelly External Gel Apply vaginaly before intercourse prn (Patient not taking: Reported on 10/14/2022) 60 g 3    tiZANidine 4 MG Oral Tab Take 4 mg by mouth nightly as needed. (Patient not taking: Reported on 10/14/2022)      hydrocortisone 1 % External Cream Apply topically 2 (two) times daily. (Patient not taking: Reported on 5/5/2025)      MISC NATURAL PRODUCT OP Take by mouth. (Patient not taking: Reported on 10/14/2022)     [6]   Allergies  Allergen Reactions    Meperidine UNKNOWN     BP dropped and had to be resuscitated    Benzoin RASH    Iodine (Topical) RASH    Hydrocodone RASH     Not sure if allergic   [7]   fluticasone propionate 50 MCG/ACT Nasal Suspension, 2 sprays by Each Nare route daily., Disp: 1 each, Rfl: 1    ergocalciferol 1.25 MG (79030 UT) Oral Cap, TK 1 C PO ONCE WEEKLY, Disp: 12 capsule, Rfl: 0    lisinopril 5 MG Oral Tab, Take 1 tablet (5 mg total) by mouth daily. *Appointment needed for further refills., Disp: 30 tablet, Rfl: 1    PROAIR  (90 Base) MCG/ACT Inhalation Aero Soln, Inhale 2 puffs into the lungs every 6 (six) hours as needed. Make appt, Disp: 1 each, Rfl: 0    metFORMIN 500 MG Oral Tab, Take 1 tablet (500 mg total) by mouth 2 (two) times daily with meals., Disp: 180 tablet, Rfl: 3    atorvastatin 10 MG Oral Tab, Take 1 tablet (10 mg total) by mouth nightly., Disp: 90 tablet, Rfl: 3    atorvastatin 10 MG Oral Tab, Take 1 tablet (10 mg total) by mouth nightly., Disp: 90 tablet, Rfl: 3    loratadine-pseudoephedrine ER (CLARITIN-D 12 HOUR) 5-120 MG Oral Tablet 12 Hr, Take 1 tablet by mouth every morning. (Patient not taking: Reported on 5/5/2025), Disp: 20 tablet, Rfl: 0    lubricant jelly External Gel, Apply vaginaly before intercourse prn (Patient not taking: Reported on 10/14/2022), Disp: 60 g, Rfl: 3    tiZANidine 4 MG  Oral Tab, Take 4 mg by mouth nightly as needed. (Patient not taking: Reported on 10/14/2022), Disp: , Rfl:     hydrocortisone 1 % External Cream, Apply topically 2 (two) times daily. (Patient not taking: Reported on 5/5/2025), Disp: , Rfl:     MISC NATURAL PRODUCT OP, Take by mouth. (Patient not taking: Reported on 10/14/2022), Disp: , Rfl:

## 2025-05-07 ENCOUNTER — MED REC SCAN ONLY (OUTPATIENT)
Dept: FAMILY MEDICINE CLINIC | Facility: CLINIC | Age: 65
End: 2025-05-07

## 2025-05-18 NOTE — TELEPHONE ENCOUNTER
Alternative request: brand name proair not on market, kareen 1 cannot substitute.  Please send generic or kareen 0 if appropriate      PROAIR  (90 Base) MCG/ACT Inhalation Aero Soln, Inhale 2 puffs into the lungs every 6 (six) hours as needed. Make appt, Disp: 1 each, Rfl: 0

## 2025-05-20 RX ORDER — ALBUTEROL SULFATE 90 UG/1
2 INHALANT RESPIRATORY (INHALATION) EVERY 6 HOURS PRN
Qty: 1 EACH | Refills: 1 | Status: SHIPPED | OUTPATIENT
Start: 2025-05-20

## 2025-05-27 RX ORDER — FLUTICASONE PROPIONATE 50 MCG
2 SPRAY, SUSPENSION (ML) NASAL DAILY
Qty: 1 EACH | Refills: 1 | Status: CANCELLED | OUTPATIENT
Start: 2025-05-27

## 2025-05-27 NOTE — TELEPHONE ENCOUNTER
Refill Request:    Current Outpatient Medications   Medication Sig Dispense Refill    fluticasone propionate 50 MCG/ACT Nasal Suspension 2 sprays by Each Nare route daily. 1 each 1     Please advise

## 2025-06-09 ENCOUNTER — HOSPITAL ENCOUNTER (OUTPATIENT)
Dept: GENERAL RADIOLOGY | Age: 65
Discharge: HOME | End: 2025-06-09
Attending: FAMILY MEDICINE
Payer: MEDICAID

## 2025-06-09 ENCOUNTER — OFFICE VISIT (OUTPATIENT)
Dept: FAMILY MEDICINE CLINIC | Facility: CLINIC | Age: 65
End: 2025-06-09

## 2025-06-09 VITALS
DIASTOLIC BLOOD PRESSURE: 80 MMHG | SYSTOLIC BLOOD PRESSURE: 134 MMHG | HEART RATE: 80 BPM | BODY MASS INDEX: 36.27 KG/M2 | HEIGHT: 57 IN | WEIGHT: 168.13 LBS

## 2025-06-09 DIAGNOSIS — I10 PRIMARY HYPERTENSION: Primary | ICD-10-CM

## 2025-06-09 DIAGNOSIS — M17.11 PRIMARY OSTEOARTHRITIS OF RIGHT KNEE: ICD-10-CM

## 2025-06-09 PROCEDURE — 73562 X-RAY EXAM OF KNEE 3: CPT | Performed by: FAMILY MEDICINE

## 2025-06-09 PROCEDURE — 99213 OFFICE O/P EST LOW 20 MIN: CPT | Performed by: FAMILY MEDICINE

## 2025-06-09 RX ORDER — TEA TREE OIL 100 %
OIL (ML) TOPICAL
Qty: 180 CAPSULE | Refills: 3 | Status: SHIPPED | OUTPATIENT
Start: 2025-06-09

## 2025-06-09 RX ORDER — LISINOPRIL 5 MG/1
5 TABLET ORAL DAILY
Qty: 30 TABLET | Refills: 1 | Status: SHIPPED | OUTPATIENT
Start: 2025-06-09

## 2025-06-09 NOTE — PROGRESS NOTES
6/9/2025  1:13 PM    Tarah Anand is a 65 year old female.    Chief complaint(s):   Chief Complaint   Patient presents with    Hypertension     Medication change     Knee Pain     Would like referral      HPI:     Tarah Anand primary complaint is regarding HTN.     Tarah Barbosa a 65 year old female presents with hypertension.  This was first diagnosed more than 1 years ago.  Current nonpharmacologic treatment includes low sodium diet, exercise, and meditation.  her current cardiac medication(s) regimen includes: Lisinopril 5 mg. ONLY take 2.5 mg. She has not kept a blood pressure diary, but states that her blood pressures have been well controll.  She is tolerating her medication(s)  well without side effects.  Compliance with treatment has been good.      In addition she is complaining of bilateral knee pains right worse than left.  In the past she says she was told by a physician that she needed possible surgery.  Denies any recent trauma accidents or injuries does take acetaminophen for her knee pain.      HISTORY:  Past Medical History[1]   Past Surgical History[2]   Family History[3]   Social History: Short Social Hx on File[4]     Immunizations:   Immunization History   Administered Date(s) Administered    Covid-19 Vaccine for; to (do) Centers ([facility]) 0.5ml 03/22/2021    Covid-19 Vaccine Pfizer Dannie-Sucrose 30 mcg/0.3 ml 01/19/2022, 05/26/2022    FLULAVAL 6 months & older 0.5 ml Prefilled syringe ([id number]) 10/14/2022    Flulaval, 3 Years & >, IM 10/25/2007, 09/11/2010    Fluvirin, 3 Years & >, Im 09/10/2011, 09/14/2013    Fluzone Vaccine Medicare () 11/27/2012, 09/12/2015    Influenza A (H1N1) 12/19/2009    TD 07/13/2006    TDAP 08/09/2014    Tb Intradermal Test 07/23/2011    Zoster Vaccine Recombinant Adjuvanted (Shingrix) 10/14/2022   Pended Date(s) Pended    TDAP 07/08/2024       Medications (Active prior to today's visit):  Current Medications[5]    Allergies:  Allergies[6]      ROS:   Review of Systems    Constitutional:  Negative for appetite change and fever.   Eyes:  Negative for visual disturbance.   Respiratory:  Negative for shortness of breath.    Cardiovascular:  Negative for chest pain.   Gastrointestinal:  Negative for abdominal pain, nausea and vomiting.   Musculoskeletal:  Positive for arthralgias (knee, R>L). Negative for back pain.   Skin:  Negative for rash.   Neurological:  Negative for dizziness and headaches.       PHYSICAL EXAM:   VS: /80   Pulse 80   Ht 4' 9\" (1.448 m)   Wt 168 lb 2 oz (76.3 kg)   LMP 08/01/2017   BMI 36.38 kg/m²     Physical Exam  Vitals reviewed.   Constitutional:       General: She is not in acute distress.     Appearance: Normal appearance.   HENT:      Head: Normocephalic.   Eyes:      Conjunctiva/sclera: Conjunctivae normal.   Cardiovascular:      Rate and Rhythm: Normal rate and regular rhythm.   Pulmonary:      Effort: Pulmonary effort is normal.      Breath sounds: Normal breath sounds.   Musculoskeletal:      Cervical back: Neck supple.      Comments: + crepitous knees R>L, NFROM, NT, no swelling   Skin:     Findings: No rash.   Psychiatric:         Mood and Affect: Mood normal.         LABORATORY RESULTS:   No results found for: \"URCOLOR\", \"URCLA\", \"URINELEUK\", \"URINENITRITE\", \"URINEBLOOD\"   Results for orders placed or performed in visit on 05/05/25   Microalb/Creat Ratio, Random Urine    Collection Time: 05/05/25 10:09 AM   Result Value Ref Range    Microalbumin, Urine <0.30 mg/dL    Creatinine Ur Random 66.20 mg/dL    Malb/Cre Calc     Comp Metabolic Panel (14)    Collection Time: 05/05/25 10:09 AM   Result Value Ref Range    Glucose 103 (H) 70 - 99 mg/dL    Sodium 142 136 - 145 mmol/L    Potassium 4.7 3.5 - 5.1 mmol/L    Chloride 105 98 - 112 mmol/L    CO2 29.0 21.0 - 32.0 mmol/L    Anion Gap 8 0 - 18 mmol/L    BUN 15 9 - 23 mg/dL    Creatinine 0.75 0.55 - 1.02 mg/dL    BUN/CREA Ratio 20.0 10.0 - 20.0    Calcium, Total 9.8 8.7 - 10.4 mg/dL    Calculated  Osmolality 295 275 - 295 mOsm/kg    eGFR-Cr 89 >=60 mL/min/1.73m2    ALT 34 10 - 49 U/L    AST 22 <34 U/L    Alkaline Phosphatase 88 50 - 130 U/L    Bilirubin, Total 0.3 0.2 - 1.1 mg/dL    Total Protein 6.8 5.7 - 8.2 g/dL    Albumin 4.5 3.2 - 4.8 g/dL    Globulin  2.3 2.0 - 3.5 g/dL    A/G Ratio 2.0 1.0 - 2.0    Patient Fasting for CMP? No    Lipid Panel    Collection Time: 05/05/25 10:09 AM   Result Value Ref Range    Cholesterol, Total 157 <200 mg/dL    HDL Cholesterol 46 40 - 59 mg/dL    Triglycerides 82 30 - 149 mg/dL    LDL Cholesterol 95 <100 mg/dL    VLDL 13 0 - 30 mg/dL    Non HDL Chol 111 <130 mg/dL    Patient Fasting for Lipid? No        EKG / Spirometry : -     Radiology: No results found.     ASSESSMENT/PLAN:   Assessment   Encounter Diagnoses   Name Primary?    Primary hypertension Yes    Primary osteoarthritis of right knee        1. Primary hypertension     HTN     MEDICATIONS: Take medications as prescribed  Refills     lisinopril 5 MG Oral Tab 30 tablet 1     Sig: Take 1 tablet (5 mg total) by mouth daily. *Appointment needed for further refills.      LABORATORY & ORDERS: No orders of the defined types were placed in this encounter.    RECOMMENDATIONS given include: avoid pseudoephedrine or other stimulants/decongestants in common cold remedies, decrease consumption of alcohol, perform routine monitoring of blood pressure with home blood pressure cuff, exercise, reduction of dietary salt intake, take medication as prescribed, try not to miss doses, smoking cessation, weight loss, and stress reduction.    FOLLOW-UP: Schedule a follow-up visit in 1 months.           2. Primary osteoarthritis of right knee    MEDICATIONS:     Requested Prescriptions     Signed Prescriptions Disp Refills    Misc Natural Products (GLUCOSAMINE CHONDROITIN VIT D3) Oral Cap 180 capsule 3     Sig: Take 2 tab po Q day    Acetaminophen 500 MG Oral Cap 100 capsule 1     Sig: Take 1 capsule (500 mg total) by mouth every 6 (six)  hours as needed.     REFERRALS:       Procedures    XR KNEE (3 VIEWS), RIGHT (CPT=73562)        RECOMMENDATIONS given include: Patient was reassured of  her medical condition and all questions and concerns were answered. Patient was informed to please, call our office with any new or further questions or concerns that may come up in the near future. Notify Dr Ramos or the [facility] if there is a deterioration or worsening of the medical condition. Also, inform the doctor with any new symptoms or medications' side effects.      FOLLOW-UP: Schedule a follow-up visit in  [facility].              Orders This Visit:  No orders of the defined types were placed in this encounter.      Meds This Visit:  Requested Prescriptions     Signed Prescriptions Disp Refills    Misc Natural Products (GLUCOSAMINE CHONDROITIN VIT D3) Oral Cap 180 capsule 3     Sig: Take 2 tab po Q day    Acetaminophen 500 MG Oral Cap 100 capsule 1     Sig: Take 1 capsule (500 mg total) by mouth every 6 (six) hours as needed.    lisinopril 5 MG Oral Tab 30 tablet 1     Sig: Take 1 tablet (5 mg total) by mouth daily. *Appointment needed for further refills.       Imaging & Referrals:  None         ZHOU RAMOS MD         [1]   Past Medical History:   Anxiety    Asthma (HCC)    Bronchitis    Chicken pox    per patient did not have it    Colon polyps    dx'd in     CTS (carpal tunnel syndrome)    Diverticulosis    Esophageal reflux    Facial mass    benign    History of blood transfusion    Measles    Osteoarthritis    Visual impairment    Glasses   [2]   Past Surgical History:  Procedure Laterality Date    Back surgery      Benign biopsy right      Breast biopsy            x2    Carpal tunnel release      Cataract      Colonoscopy N/A 10/1/2021    Procedure: COLONOSCOPY;  Surgeon: Jonathan Grimes MD;  Location: [facility] ENDOSCOPY    Correct bunion,othr methods      Eye surgery      Foot surgery Right     Repair rotator cuff,acute Left      1/2014    Spine surgery procedure unlisted      Tubal ligation Bilateral    [3]   Family History  Problem Relation Age of Onset    Diabetes Mother         type 2    Hypertension Mother     Asthma Father     Breast Cancer Sister 59    Cancer Sister     Hypertension Sister     Breast Cancer Niece 61    Breast Cancer Other 53        breast cancer    Cancer Other    [4]   Social History  Socioeconomic History    Marital status:    Tobacco Use    Smoking status: Never    Smokeless tobacco: Never   Vaping Use    Vaping status: Never Used   Substance and Sexual Activity    Alcohol use: Yes     Alcohol/week: 0.0 standard drinks of alcohol     Comment: sometimes/social basis only-beer    Drug use: No   Other Topics Concern    Caffeine Concern Yes     Comment: coffee     Social Drivers of Health      Received from [facility]    Housing Stability   [5]   Current Outpatient Medications   Medication Sig Dispense Refill    Misc Natural Products (GLUCOSAMINE CHONDROITIN VIT D3) Oral Cap Take 2 tab po Q day 180 capsule 3    Acetaminophen 500 MG Oral Cap Take 1 capsule (500 mg total) by mouth every 6 (six) hours as needed. 100 capsule 1    lisinopril 5 MG Oral Tab Take 1 tablet (5 mg total) by mouth daily. *Appointment needed for further refills. 30 tablet 1    albuterol (PROAIR HFA) 108 (90 Base) MCG/ACT Inhalation Aero Soln Inhale 2 puffs into the lungs every 6 (six) hours as needed. Make appt 1 each 1    fluticasone propionate 50 MCG/ACT Nasal Suspension 2 sprays by Each Nare route daily. 1 each 1    ergocalciferol 1.25 MG (91906 UT) Oral Cap TK 1 C PO ONCE WEEKLY 12 capsule 0    metFORMIN 500 MG Oral Tab Take 1 tablet (500 mg total) by mouth 2 (two) times daily with meals. 180 tablet 3    atorvastatin 10 MG Oral Tab Take 1 tablet (10 mg total) by mouth nightly. 90 tablet 3   [6]   Allergies  Allergen Reactions    Meperidine UNKNOWN     BP dropped and had to be resuscitated    Benzoin RASH    Iodine  (Topical) RASH    Hydrocodone RASH     Not sure if allergic

## 2025-06-12 ENCOUNTER — TELEPHONE (OUTPATIENT)
Dept: FAMILY MEDICINE CLINIC | Facility: CLINIC | Age: 65
End: 2025-06-12

## 2025-06-12 NOTE — TELEPHONE ENCOUNTER
The patient said she went to  her lisinopril and it was not covered by insurance, because it was too soon to  the medication because she had picked up a 90 day script from the pharmacy of 2.5 mg.     She also could not get her glucosamine or her acetaminophen because they did not have it at the time and she said they have not called her back to let her know it is ready.     Called the pharmacy to clarify the medications, cost and why she was not able to fill the glucosamine or acetaminophen.     Regarding the lisinopril: they said since she was prescribed a 90 day supply in April 19th she needs to take all of those until she is out. Informed them she is out now, as that was 54 days ago and she has only been taking 2 which is a 45 day supply. They still stated it is too soon and she would not be able to  the medication until July 19th. They originally would not give me an exact cost, they stated \"it is not much don't worry about it\" informed them I needed the cost as we need to know if the patient is going to be able to  the medication as it is for her blood pressure. They gave me the cost of $9.     Regarding the glucosamine & acetaminophen: they stated those medications are available over the counter so insurance will not cover those medication.     Called the patient back to let her know of the above. She verbalized understanding.   Language Line [phone]

## 2025-06-13 DIAGNOSIS — M17.11 PRIMARY OSTEOARTHRITIS OF RIGHT KNEE: Primary | ICD-10-CM

## 2025-07-11 RX ORDER — ALBUTEROL SULFATE 90 UG/1
2 INHALANT RESPIRATORY (INHALATION) EVERY 6 HOURS PRN
Qty: 54 G | Refills: 3 | Status: SHIPPED | OUTPATIENT
Start: 2025-07-11

## (undated) DIAGNOSIS — M67.88 ACHILLES TENDINOSIS OF RIGHT LOWER EXTREMITY: Primary | ICD-10-CM

## (undated) DIAGNOSIS — Z98.890 STATUS POST FOOT SURGERY: ICD-10-CM

## (undated) DEVICE — NVM5 PROBE SNGL USE STER PKG

## (undated) DEVICE — STERILE LATEX POWDER-FREE SURGICAL GLOVESWITH NITRILE COATING: Brand: PROTEXIS

## (undated) DEVICE — ARISTA 1 GRAM

## (undated) DEVICE — Device: Brand: DEFENDO AIR/WATER/SUCTION AND BIOPSY VALVE

## (undated) DEVICE — KIT CLEAN ENDOKIT 1.1OZ GOWNX2

## (undated) DEVICE — GOWN SURG AERO BLUE PERF LG

## (undated) DEVICE — HEXAGONAL CAPTIVATOR STIFF 13M

## (undated) DEVICE — SUTURE PLAIN GUT 5-0 PC-1

## (undated) DEVICE — SUTURE SILK 2-0 SA65H

## (undated) DEVICE — SUCTION CANISTER, 3000CC,SAFELINER: Brand: DEROYAL

## (undated) DEVICE — SUTURE PROLENE 4-0 FS-2

## (undated) DEVICE — SOL  .9 1000ML BTL

## (undated) DEVICE — SUTURE CHROMIC GUT 3-0 1638H

## (undated) DEVICE — SUTURE VICRYL 3-0 SH

## (undated) DEVICE — MEDI-VAC NON-CONDUCTIVE SUCTION TUBING 6MM X 1.8M (6FT.) L: Brand: CARDINAL HEALTH

## (undated) DEVICE — HEAD & NECK: Brand: MEDLINE INDUSTRIES, INC.

## (undated) DEVICE — FORCEP RADIAL JAW 4

## (undated) DEVICE — LINE MNTR ADLT SET O2 INTMD

## (undated) DEVICE — 35 ML SYRINGE REGULAR TIP: Brand: MONOJECT

## (undated) DEVICE — CONMED SCOPE SAVER BITE BLOCK, 20X27 MM: Brand: SCOPE SAVER

## (undated) NOTE — LETTER
12/3/2019              [facility]        [address] [address]         Dear Sheba Murrell,    It was a pleasure to see you. Your PAP test was normal.  There is no need for further testing at this time.   I look forward to seeing you at y

## (undated) NOTE — LETTER
3/19/2021          To Whom It May Concern:    Felipe Husain is currently under my medical care and may not return to work until 4/5/21. Activity is restricted as follows: patient may return to work on 4/5/21.  Patient may work 4 hrs per day 4 days a wee

## (undated) NOTE — LETTER
2/10/2022          To Whom It May Concern:    Rosemary Tanner is currently under my medical care and may not return to work, until Monday February 14, 2022 Please excuse Alka Mcclain for 2days. Activity is restricted as follows: Work for 40 hours a week with 3 breaks a day 20 minutes each. Please allow Alka Gironyeojse the following restrictions until April 11, 2022. If  you require additional information  please contact our office.         Sincerely,    Rodriguez Brand DPM          Document generated by:  So Wilson MA

## (undated) NOTE — LETTER
October 4, 2017     Funmi Potts  [address] [place] [address]      Dear Deborah Court:    Below are the results from your recent visit: results are within normal limits    Resulted Orders   XR CHEST PA + LAT CHEST (CPT=71020)    Narrative    PROCEDU

## (undated) NOTE — LETTER
AUTHORIZATION FOR SURGICAL OPERATION OR OTHER PROCEDURE    1.  I hereby authorize Dr Ofelia Khalil. , and [employer] staff assigned to my case to perform the following operation and/or procedure at the [facility]:  Endometrial Biopsy  ____ Time:  ________ A. M.  P.M.        Patient Name:  ______________________________________________________  (please print)      Patient signature:  ___________________________________________________             Relationship to Patient:

## (undated) NOTE — LETTER
Lyn Ortega Md  [address]  [address] [facility] [facility]       09/26/17        Patient: Alie Smalls   YOB: 1960   Date of Visit: 9/26/2017       Dear  Dr. Rob Kay MD,      Thank you for referring Alie Smalls to my prac

## (undated) NOTE — LETTER
6/17/2020              [facility]         Dear Deborah Nguyen,    This letter is to inform you that our office has made several attempts to reach you by phone without success.   We were calling to help you set

## (undated) NOTE — LETTER
[facility]  Administration of Anesthesia  1. Venus Nicholson, or _________________________________ acting on her behalf, (Patient) (Dependent/Representative) request to receive anesthesia for my pending procedure/operation/treatment.   A bleeding, seizure, cardiac arrest and death. 7. AWARENESS: I understand that it is possible (but unlikely) to have explicit memory of events from the operating room while under general anesthesia.   8. ELECTROCONVULSIVE THERAPY PATIENTS: This consent serve below affirms that prior to the time of the procedure, I have explained to the patient and/or his/her guardian, the risks and benefits of undergoing anesthesia, as well as any reasonable alternatives.     ___________________________________________________

## (undated) NOTE — LETTER
8/5/2021          To Whom It May Concern:    Pili Corrine is currently under my medical care. She is to continue the current restrictions x4 weeks. Follow up in 4 weeks    If you require additional information please contact our office.         Sincerel

## (undated) NOTE — LETTER
12/9/2021          To Whom It May Concern:    Brian Collier is currently under my medical care and may return to work at this time. Please allow Doug Julien the following restrictions for the next 2 months. Activity is restricted as follows:     Work 40h

## (undated) NOTE — LETTER
9/7/2021          To Whom It May Concern:    Nicholas Wolfe is currently under my medical care. She is to continue her work restrictions until 09/24/21. If you require additional information please contact our office.         Sincerely,    Brian Huston

## (undated) NOTE — LETTER
09/13/21        [place] [address]      Dear Cristel Dooley,    [facility] records indicate that you have outstanding lab work and or testing that was ordered for you and has not yet been completed:  Orders Placed This Encounter      CB

## (undated) NOTE — LETTER
8/3/2018          To Whom It May Concern:    Jillian Vargas is currently under my medical care and may not return to work at this time. Please excuse Bia Velazquez for 2 days. She may return to work on 8/6/18. Activity is restricted as follows: none.     If

## (undated) NOTE — LETTER
[facility], [place]  Authorization for Invasive Procedures  1.  I hereby authorize Dr. Court Zamarripa , my physician and whomever may be designated as the doctor's assistant, to perform the following operation and/or procedure:  Esop risks that can occur: fever and allergic reactions, hemolytic reactions, transmission of disease such as hepatitis, AIDS, cytomegalovirus (CMV), and flluid overload.  In the event that I wish to have autologous transfusions of my own blood, or a directed do of my physician.      Signature of Patient:  ________________________________________________ Date: _________Time: _________    Responsible person in case of minor or unconscious: _____________________________Relationship: ____________     Witness Signature

## (undated) NOTE — LETTER
8/29/2022          To Whom It May Concern:    John Pepe is currently under my medical care and may return to work at this time. Activity is restricted as follows: For 2 hours standing, follow by 10 minutes break for one month. If you require additional information please contact our office.         Sincerely,    Natalie Aponte DPM

## (undated) NOTE — LETTER
12/6/2021          To Whom It May Concern:    Nils Pastor is currently under my medical care and may not return to work at this time. Please excuse Zena Jones for 10 days. She may return to work on 12/09/21. Activity is restricted as follows: none.

## (undated) NOTE — LETTER
6/27/2022          To Whom It May Concern:    Charmayne Crape is currently under my medical care and may not return to work at this time. The next appointment is scheduled July 27 2022  Activity is restricted as follows: Seating duties only. If you require additional information please contact our office.         Sincerely,    Keron Esparza DPM          Document generated by:  Kemar Rivera MA

## (undated) NOTE — LETTER
5/13/2020        To Whom It May Concern:    Deepika Watt is currently under my medical care and may return to work at this time. Please excuse Alexis Granados for 2 days. She may return to work on 05/14/20. Activity is restricted as follows: none.     If you

## (undated) NOTE — LETTER
10/29/2021          To Whom It May Concern:    Polo Nguyen is currently under my medical care. She may return to working 40 hours a week with no restrictions at this time. Please provide her with an additional break, 20 minutes, for the next month.  She

## (undated) NOTE — LETTER
12/26/19        [place] [id number]      Dear Jo-Ann Mata,    [facility] records indicate that you have outstanding lab work and or testing that was ordered for you and has not yet been completed:  Orders Placed This Encounter

## (undated) NOTE — LETTER
8/5/2021          To Whom It May Concern:    Funmi Potts is currently under my medical care and may not return to { school/work:[id number]} at this time. Please excuse Deborah Court for {NUMBERS 0-10:8928} {days weeks:3323::\"days\"}.   {HE/SHE :0730} may

## (undated) NOTE — LETTER
November 12, 2018     [place] [address]      Dear Calvin Moreno:    Below are the results from your recent visit: the following results are within normal limits:  ecg.      Resulted Orders   EKG 12-LEAD    Narrative    ECG Rep

## (undated) NOTE — LETTER
3/12/2020              [address] [place] [id number]         To Whom it may concern:     This is to certify that Jillian Vargas had an appointment on 3/12/2020 at 12:06 PM with Rhonda Torre MD.        Sincerely,    RI

## (undated) NOTE — LETTER
8/7/2019          To Whom It May Concern:    Roosevelt Schultz is currently under my medical care and may return to work at this time. She may return to work on 08/08/19. Activity is restricted as follows: light duty and no lifting over 20 lbs.  and no pro

## (undated) NOTE — LETTER
8/29/2022          To Whom It May Concern:    Charmayne Crape is currently under my medical care and may return to work at this time. Activity is restricted as follows: Work standing up for 2 hours follow by 10 minute break, for one month until September 26, 2022. If you require additional information please contact our office.         Sincerely,    Keron Esparza DPM

## (undated) NOTE — LETTER
4/7/2021          To Whom It May Concern:    Malini Awad currently under my medical care and may not return to work until 4/5/21.     She may work with the following restrictions until 05/03/21:    -- Sitting duties for 4 hours per day for 4 days a w

## (undated) NOTE — LETTER
7/27/2022          To Whom It May Concern:    Vonne Brunner is currently under my medical care and may not return to work at this time. Please excuse Kannan Failing for 3 weeks. She will follow up then. If you require additional information please contact our office.         Sincerely,    Isaac Grant DPM

## (undated) NOTE — LETTER
8/24/2020          To Whom It May Concern:    Jignesh Bethea is currently under my medical care and may not return to work at this time. Off work due to [place] 19 infection. We'll reevaluated by retesting today.   If you require additional information pl

## (undated) NOTE — LETTER
7/9/2021          To Whom It May Concern:    Chava Alston is currently under my medical care. Activity is restricted as follows: For the next 4 weeks. 5 days a week - 6 hours with sitting out of every hour.      If you require additional information p

## (undated) NOTE — LETTER
10/6/2022          To Whom It May Concern:    Christina Vargas is currently under my medical care and may return to work at this time. Activity is restricted as follows: Work standing up to 2 hours follow by 20 minutes break. Restrictions She's able to lift 25 pounds only until next reevaluation appointment November 8th 2022. If you require additional information please contact our office.         Sincerely,    Luz Nuñez DPM

## (undated) NOTE — LETTER
03/08/21        [address] [place] [facility] [address]      Dear Zena Jones,    [address] [facility] records indicate that you have outstanding lab work and or testing that was ordered for you and has not yet been completed:  Orders Placed This Encounter      CB

## (undated) NOTE — LETTER
03/08/21        [place] [address]      Dear Alysha Chung,    [address] [facility] records indicate that you have outstanding lab work and or testing that was ordered for you and has not yet been completed:  Orders Placed This Encounter      CB

## (undated) NOTE — MR AVS SNAPSHOT
[address]  [address] [place] [phone]               Thank you for choosing us for your health care visit with German Gu MD.  We are glad to serve you and happy to provide you with this summ If you are confident that your benefit plan will not require a referral or authorization, such as [place] Medicaid, please feel free to schedule your appointment immediately.  However, if you are unsure about the requirements for authorization, please wait Esomeprazole Magnesium 40 MG Cpdr   Take 1 capsule(s) by mouth daily   Commonly known as:  NEXIUM           Mometasone Furoate 0.1 % Crea   Apply to affected area(s) BID   Commonly known as:  ELOCON           simvastatin 20 MG Tabs   Take 1 tablet (20 mg Healthy Diet and Regular Exercise  [employer] [facility] [address] for making healthy food choices  -   Enjoy your food, but eat less. Fully enjoy your food when eating. Don’t eat while distracted and slow down. Avoid over sized portions.

## (undated) NOTE — LETTER
9/17/2020          To Whom It May Concern:    Betina Hatfield is currently under my medical care and may return to work at this time. Please excuse Terri Sherman for 5 days. Recent COVID 19 test was negative. She may return to work on 09/21/20.   Activity is r

## (undated) NOTE — LETTER
7/27/2022          To Whom It May Concern:    Kami Saavedra is currently under my medical care and may not return to work at this time. Please excuse Hans Urena for 4 weeks. She will follow up then. If you require additional information please contact our office.         Sincerely,    Douglas Candelaria DPM

## (undated) NOTE — LETTER
November 14, 2018     [address] [place] [id number]      Dear David Enriquez:    Below are the results from your recent visit:    Resulted Orders   COMP METABOLIC PANEL (14)   Result Value Ref Range    Glucose 109 (H) 70 - 99 mg/dL    S

## (undated) NOTE — LETTER
9/28/2019          To Whom It May Concern:    Colonel Gonzales is currently under my medical care and may return to work at this time. She may return to work. Activity is restricted as follows: none.     If you require additional information please contac

## (undated) NOTE — LETTER
5/3/2021          To Whom It May Concern:    Vel Goldsmith is currently under my medical care and may not return to work until May 25, 2021. If you require additional information please contact our office.         Sincerely,    Kerry Chavez DPM

## (undated) NOTE — LETTER
9/1/2022          To Whom It May Concern:    Linda Ledbetter is currently under my medical care and may return to work at this time. Activity is restricted as follows: Work standing up to 2 hours follow by 20 minute break. Restrictions she's able to lift 25 pounds only. Until next appointment October 6th 2022. If you require additional information please contact our office.         Sincerely,    Mindy Tijerina DPM

## (undated) NOTE — LETTER
10/6/2022          To Whom It May Concern:    Shavonne Lyon is currently under my medical care and may return to work at this time. Activity is restricted as follows: Work standing up to 2 hours follow by 20 minutes break. Restrictions she's able to lift 25 pounds only until next reevaluation appointment. If you require additional information please contact our office.         Sincerely,    Gaby Vasquez DPM

## (undated) NOTE — LETTER
6/2/2022          To Whom It May Concern:    Kami Saavedra is currently under my medical care and may not return to  at this time. The next appointment is scheduled for June 27, 2022 in my office. If you require additional information please contact our office.         Sincerely,    Douglas Candelaria DPM

## (undated) NOTE — LETTER
October 4, 2017     [address] [place] [id number]      Dear Sissy Obregon:    Below are the results from your recent visit: results are within normal limits    Resulted Orders   COMP METABOLIC PANEL (14)   Result Value Ref Range    Gluco

## (undated) NOTE — LETTER
8/8/2019      To Whom It May Concern:    Manolo Herrera is currently under my medical care and may return to work at this time. She may return to work on 08/07/19. Activity is restricted as follows: light duty and no lifting over 20 lbs.  and no prolong

## (undated) NOTE — LETTER
10/21/2021          To Whom It May Concern:    Teri Murrieta is currently under my medical care and may return to working 40 hours a week with no restrictions at this time. Please provide her with an additional break for the next month.  She will be reeval

## (undated) NOTE — LETTER
06/09/21        [address] [place] [id number]      Dear [facility],    [facility] records indicate that you have outstanding lab work and or testing that was ordered for you and has not yet been completed:  Orders Placed This Encounter      CB

## (undated) NOTE — LETTER
12/06/18        [place] [id number]      Dear Janay Murphy,    [address] records indicate that you have outstanding lab work and or testing that was ordered for you and has not yet been completed:  Orders Placed This Encounter      Co

## (undated) NOTE — LETTER
3/19/2019        To Whom It May Concern:    Flavia Yousif is currently under my medical care and may not return to work at this time. Please excuse Ke Velásquez for 1 weeks. She may return to work on 03/25/29. Activity is restricted as follows: none.     I

## (undated) NOTE — LETTER
February 19, 2018     Brian Collier  [facility] [facility] [address] [place] [id number]      Dear Doug Julien:    Below are the results from your recent visit:    Resulted Orders   C-REACTIVE PROTEIN   Result Value Ref Range    C-Reactive Protein 0.9 0.0 - 0.9 mg/dL

## (undated) NOTE — LETTER
1/9/2020          To Whom It May Concern:    Malini Verdugo is currently under my medical care and may not return to work at this time. Please excuse Calvin Moreno for 1 weeks. She may return to work on 01/13/20. Activity is restricted as follows: none.

## (undated) NOTE — LETTER
3/19/2021          To Whom It May Concern:    Sara Salinas is currently under my medical care and may not return to until 4/5/21. Activity is restricted as follows: patient may return to work on  4/5/21. Patient may work 4 hrs per day 4 days a week.   If